# Patient Record
Sex: FEMALE | Race: WHITE | NOT HISPANIC OR LATINO | Employment: FULL TIME | URBAN - NONMETROPOLITAN AREA
[De-identification: names, ages, dates, MRNs, and addresses within clinical notes are randomized per-mention and may not be internally consistent; named-entity substitution may affect disease eponyms.]

---

## 2019-10-03 ENCOUNTER — OFFICE VISIT (OUTPATIENT)
Dept: OBSTETRICS AND GYNECOLOGY | Facility: CLINIC | Age: 24
End: 2019-10-03

## 2019-10-03 VITALS
BODY MASS INDEX: 21.05 KG/M2 | SYSTOLIC BLOOD PRESSURE: 108 MMHG | DIASTOLIC BLOOD PRESSURE: 74 MMHG | WEIGHT: 147 LBS | HEIGHT: 70 IN

## 2019-10-03 DIAGNOSIS — N94.6 DYSMENORRHEA: Primary | ICD-10-CM

## 2019-10-03 PROCEDURE — 99202 OFFICE O/P NEW SF 15 MIN: CPT | Performed by: OBSTETRICS & GYNECOLOGY

## 2019-10-03 RX ORDER — LEVONORGESTREL AND ETHINYL ESTRADIOL 0.15-0.03
KIT ORAL
COMMUNITY
Start: 2019-06-12 | End: 2019-10-03

## 2019-10-03 RX ORDER — NORETHINDRONE ACETATE AND ETHINYL ESTRADIOL 1; .02 MG/1; MG/1
1 TABLET ORAL DAILY
Qty: 42 TABLET | Refills: 12 | Status: SHIPPED | OUTPATIENT
Start: 2019-10-03 | End: 2020-10-02

## 2019-10-03 NOTE — PROGRESS NOTES
"Yumi Jerry is a 24 y.o. female here today to discuss treatment of dysmenorrhea.  She describes severe pelvic cramping around her menstrual period.  She has been on several different birth control pills, both cyclic and continuous, for treatment.  She is currently on Seasonale.  she reports that her symptoms are initially relieved, but with time her symptoms return.  She denies abdominal pain otherwise.  She denies vaginal discharge, or association of pain with bowel or bladder function.    Social History     Tobacco Use   • Smoking status: Never Smoker   • Smokeless tobacco: Never Used   Substance Use Topics   • Alcohol use: No     Frequency: Never   • Drug use: No      Visit Vitals  /74 (BP Location: Left arm, Patient Position: Sitting)   Ht 177.8 cm (70\")   Wt 66.7 kg (147 lb)   LMP 09/03/2019 (Approximate)   BMI 21.09 kg/m²     Pleasant female no acute distress  Mood and affect normal  Breathing unlabored  Abdomen nontender    Assessment: Primary dysmenorrhea    We discussed therapeutic options including cyclic birth control pills, continuous birth control pills, Depo-Provera, and a Mirena IUD.  She has chosen a trial of a different continuous birth control pill.  I have given her prescription and we discussed discarding placebo pills for up to 1 year.  We discussed a side effect of unscheduled bleeding.  She will follow-up in 2 to 3 months to recheck her symptoms, and for a pelvic ultrasound.    "

## 2020-10-12 RX ORDER — NORETHINDRONE ACETATE/ETHINYL ESTRADIOL 1MG-20MCG
KIT ORAL
Qty: 42 TABLET | Refills: 0 | Status: SHIPPED | OUTPATIENT
Start: 2020-10-12 | End: 2020-10-20

## 2020-10-14 ENCOUNTER — TELEPHONE (OUTPATIENT)
Dept: OBSTETRICS AND GYNECOLOGY | Facility: CLINIC | Age: 25
End: 2020-10-14

## 2020-10-14 NOTE — TELEPHONE ENCOUNTER
Spoke with patients mother 10/13 regarding her yearly appointment. Dr Padilla only allows a certain amount of yearlys on his schedule for a day and the patients mother was requesting specifically 10/20. The appointment was originally for an OV but is wanting it switched to a yearly.

## 2020-10-20 ENCOUNTER — OFFICE VISIT (OUTPATIENT)
Dept: OBSTETRICS AND GYNECOLOGY | Facility: CLINIC | Age: 25
End: 2020-10-20

## 2020-10-20 VITALS
BODY MASS INDEX: 21.05 KG/M2 | WEIGHT: 147 LBS | HEIGHT: 70 IN | DIASTOLIC BLOOD PRESSURE: 74 MMHG | SYSTOLIC BLOOD PRESSURE: 112 MMHG

## 2020-10-20 DIAGNOSIS — N94.6 DYSMENORRHEA: ICD-10-CM

## 2020-10-20 DIAGNOSIS — Z01.419 ENCOUNTER FOR GYNECOLOGICAL EXAMINATION WITHOUT ABNORMAL FINDING: Primary | ICD-10-CM

## 2020-10-20 DIAGNOSIS — Z12.4 SCREENING FOR CERVICAL CANCER: ICD-10-CM

## 2020-10-20 PROCEDURE — 99395 PREV VISIT EST AGE 18-39: CPT | Performed by: OBSTETRICS & GYNECOLOGY

## 2020-10-20 PROCEDURE — G0123 SCREEN CERV/VAG THIN LAYER: HCPCS | Performed by: OBSTETRICS & GYNECOLOGY

## 2020-10-20 RX ORDER — DESOGESTREL AND ETHINYL ESTRADIOL 0.15-0.03
1 KIT ORAL DAILY
Qty: 56 TABLET | Refills: 10 | Status: SHIPPED | OUTPATIENT
Start: 2020-10-20 | End: 2021-04-13

## 2020-10-20 NOTE — PROGRESS NOTES
"CC: annual exam    SUBJECTIVE: Yumi Jerry is a 25 y.o. female , para 0, who comes to the office today for annual GYN examination. Last menstrual period was 3 weeks ago and she has not had a Pap smear.  She is currently on OCPs for dysmenorrhea. Her medical history is reviewed.     HPI      Social History     Tobacco Use   • Smoking status: Never Smoker   • Smokeless tobacco: Never Used   Substance Use Topics   • Alcohol use: No     Frequency: Never   • Drug use: No        Review of Systems   Constitutional: Negative for fever.   Respiratory: Negative for cough.    Gastrointestinal: Negative for abdominal pain.   Genitourinary: Positive for menstrual problem.   Hematological: Does not bruise/bleed easily.       Visit Vitals  /74 (BP Location: Left arm, Patient Position: Sitting)   Ht 177.8 cm (70\")   Wt 66.7 kg (147 lb)   BMI 21.09 kg/m²      Objective   Physical Exam  Vitals signs and nursing note reviewed.   Constitutional:       General: She is not in acute distress.     Appearance: She is well-developed.   HENT:      Head: Normocephalic and atraumatic.   Neck:      Musculoskeletal: Normal range of motion and neck supple.      Thyroid: No thyromegaly.   Cardiovascular:      Rate and Rhythm: Normal rate and regular rhythm.      Heart sounds: No murmur.   Pulmonary:      Effort: Pulmonary effort is normal.      Breath sounds: Normal breath sounds.   Abdominal:      General: There is no distension.      Palpations: Abdomen is soft.      Tenderness: There is no abdominal tenderness.   Genitourinary:     General: Normal vulva.      Exam position: Lithotomy position.      Labia:         Right: No tenderness or lesion.         Left: No tenderness or lesion.       Urethra: No prolapse.      Vagina: Normal. No vaginal discharge or bleeding.      Cervix: Normal.      Uterus: Normal.       Adnexa:         Right: No tenderness or fullness.          Left: No tenderness or fullness.     Musculoskeletal: Normal range " of motion.   Skin:     General: Skin is warm and dry.   Neurological:      Mental Status: She is alert and oriented to person, place, and time.   Psychiatric:         Behavior: Behavior normal.         Judgment: Judgment normal.       Assessment/Plan   Diagnoses and all orders for this visit:    1. Encounter for gynecological examination without abnormal finding (Primary)    2. Dysmenorrhea  -     desogestrel-ethinyl estradiol (APRI) 0.15-30 MG-MCG per tablet; Take 1 tablet by mouth Daily. Take in continuous fashion as directed for painful periods  Dispense: 56 tablet; Refill: 10    3. Screening for cervical cancer  -     Liquid-based Pap Smear, Screening        I have refilled her birth control for a year.  We will notify her when the Pap smear results are available. We have discussed current Pap smear screening guidelines.  She will return in one year. In the meantime if she develops questions or problems, she will notify the office.

## 2020-10-22 LAB
GEN CATEG CVX/VAG CYTO-IMP: NORMAL
LAB AP CASE REPORT: NORMAL
LAB AP GYN ADDITIONAL INFORMATION: NORMAL
PATH INTERP SPEC-IMP: NORMAL
STAT OF ADQ CVX/VAG CYTO-IMP: NORMAL

## 2021-01-15 ENCOUNTER — TELEPHONE (OUTPATIENT)
Dept: OBSTETRICS AND GYNECOLOGY | Facility: CLINIC | Age: 26
End: 2021-01-15

## 2021-01-15 NOTE — TELEPHONE ENCOUNTER
Pt called stating her ocp was not working and she was experiencing cramping again. Called to inform pt to come in for f/u appt and discuss with Dr. Padilla, no answer, left voicemail to return my call at her earliest convenience.

## 2021-01-15 NOTE — TELEPHONE ENCOUNTER
Pt called back and wanted to know if he was just going to switch her birth controls because she is not wanting to change as she is getting  in September and is wanting to conceive pretty quick. I informed her I would speak with Dr. Padilla on Monday and see what he wanted to do and let her know on Monday, pt voiced understanding.

## 2021-01-20 NOTE — TELEPHONE ENCOUNTER
Called pt and informed her that I forgot to speak with Dr. Padilla before he left but I would speak with him tomorrow and give her a call, pt voiced understanding.

## 2021-01-21 NOTE — TELEPHONE ENCOUNTER
Patient called today and discussed her issues with her birth control, painful periods and issues with having a period even while on continuous birth control. Patient is ultimately wanting to come off her birth control soon due to wanting to conceive. Patient is very concerned with the amount of cramping and pain she is having with her periods and if it is related to endometriosis. Encouraged patient that an appointment with Dr Padilla would probably be most beneficial at this time that way they could sit down and discuss all concerns face to face with a possible US. Patient agrees to this and is sent to scheduling for an appointment.

## 2021-01-22 ENCOUNTER — OFFICE VISIT (OUTPATIENT)
Dept: OBSTETRICS AND GYNECOLOGY | Facility: CLINIC | Age: 26
End: 2021-01-22

## 2021-01-22 VITALS
SYSTOLIC BLOOD PRESSURE: 114 MMHG | BODY MASS INDEX: 21.33 KG/M2 | WEIGHT: 149 LBS | DIASTOLIC BLOOD PRESSURE: 82 MMHG | HEIGHT: 70 IN

## 2021-01-22 DIAGNOSIS — N94.6 DYSMENORRHEA: Primary | ICD-10-CM

## 2021-01-22 PROCEDURE — 99213 OFFICE O/P EST LOW 20 MIN: CPT | Performed by: OBSTETRICS & GYNECOLOGY

## 2021-01-22 RX ORDER — IBUPROFEN 800 MG/1
TABLET ORAL
Qty: 15 TABLET | Refills: 9 | Status: SHIPPED | OUTPATIENT
Start: 2021-01-22 | End: 2021-04-13

## 2021-01-22 NOTE — PROGRESS NOTES
"Yumi Jerry is a 25 y.o. female here today for follow-up of dysmenorrhea.  She is currently on a continuous birth control pill and has been for several years.  Over the last month she has had some breakthrough bleeding as well as episodic pelvic pain.  She is getting  in August and plans to try to get pregnant shortly thereafter.  She would like to come off of her birth control pills in anticipation of her marriage and attempts at pregnancy, but is afraid of her menstrual symptoms returning.    Visit Vitals  /82 (BP Location: Left arm, Patient Position: Sitting, Cuff Size: Adult)   Ht 177.8 cm (70\")   Wt 67.6 kg (149 lb)   LMP 01/10/2021   Breastfeeding No   BMI 21.38 kg/m²     Pleasant female no acute distress  Mood and affect normal  Breathing unlabored    Assessment: Dysmenorrhea, breakthrough bleeding on birth control pills    She wishes to go ahead and stop her continuous birth control pill at this time.  We discussed that her cycles should return to regular, and I would expect some dysmenorrhea as well.  However it has been quite a while since she has had a natural cycle, and we discussed the possibility of resolution of her symptoms.  We discussed nonhormonal therapy for dysmenorrhea including scheduled NSAIDs, and I have given her a prescription for 800 mg ibuprofen 3 times a day for 5 days.  She is instructed to begin this as soon as she knows that her bleeding is imminent.  If she does not get decent relief from this therapy, she may consider diagnostic laparoscopy.  She will follow-up as needed, and contact the office if she has questions or concerns.        "

## 2021-03-30 ENCOUNTER — TELEPHONE (OUTPATIENT)
Dept: OBSTETRICS AND GYNECOLOGY | Facility: CLINIC | Age: 26
End: 2021-03-30

## 2021-03-30 NOTE — TELEPHONE ENCOUNTER
Please call pt and sched her a Consult visit with Dr Padilla. Pt already knows what appt is for. Thank you.

## 2021-03-30 NOTE — TELEPHONE ENCOUNTER
Pt called c/o heavy period, states 800mg Ibuprofen not working. Rates pain @ a 4 today. Yest went thru 8 super plus tampons ang rated pain @ 8-9. LMP 3/28, before that was 2/22 lasted 2 wks. Pt ready to proceed with [facility]. Do you want me to go ahead and sched her a consult? Please advise.

## 2021-04-13 ENCOUNTER — OFFICE VISIT (OUTPATIENT)
Dept: OBSTETRICS AND GYNECOLOGY | Facility: CLINIC | Age: 26
End: 2021-04-13

## 2021-04-13 VITALS
WEIGHT: 154 LBS | BODY MASS INDEX: 22.05 KG/M2 | HEIGHT: 70 IN | DIASTOLIC BLOOD PRESSURE: 74 MMHG | SYSTOLIC BLOOD PRESSURE: 112 MMHG

## 2021-04-13 DIAGNOSIS — N94.6 DYSMENORRHEA: Primary | ICD-10-CM

## 2021-04-13 PROCEDURE — 99214 OFFICE O/P EST MOD 30 MIN: CPT | Performed by: OBSTETRICS & GYNECOLOGY

## 2021-04-13 RX ORDER — SODIUM CHLORIDE 9 MG/ML
100 INJECTION, SOLUTION INTRAVENOUS CONTINUOUS
Status: CANCELLED | OUTPATIENT
Start: 2021-04-13

## 2021-04-13 NOTE — PROGRESS NOTES
"Yumi Jerry is a 25 y.o. female here today for follow-up of dysmenorrhea.  Her symptoms were fairly well controlled on continuous birth control pills, but she is getting  in September and wanted to go ahead and come off of the pills as she wants to get pregnant soon after her marriage.  At her last visit we discussed using scheduled NSAIDs to try to control her menstrual cramping.  She reports that she had 2 days of severe cramping despite taking scheduled NSAIDs.  The cramping persisted for a few more days to a lesser extent even though bleeding was not heavy.  She is concerned about the possibility of endometriosis.    Visit Vitals  /74 (BP Location: Left arm, Patient Position: Sitting)   Ht 177.8 cm (70\")   Wt 69.9 kg (154 lb)   LMP 03/29/2021 (Exact Date)   BMI 22.10 kg/m²     Pleasant female no acute distress  Mood and affect normal  Breathing unlabored    Pelvic ultrasound from January 2021 was normal  Pap smear October 2020 was normal    Assessment: Dysmenorrhea    She is concerned about endometriosis, therefore we discussed a diagnostic laparoscopy.  She is ready to proceed with further evaluation of her symptoms, especially since endometriosis may impact fertility.  Therefore we discussed a planned surgical procedure of diagnostic laparoscopy with possible biopsy or treatment of endometriosis.  All of her questions were answered, and preoperative orders, including labs, have been placed.  She is scheduled for surgery on April 21.      "

## 2021-04-14 NOTE — H&P (VIEW-ONLY)
" [place]  Yumi Jerry  : 1995  MRN: [id number]  CSN: [id number]    History and Physical    Subjective   Yumi Jerry is a 25 y.o. year old  who presents for surgery due to persistent dysmenorrhea.  She is concerned about endometriosis.  Her pelvic ultrasound is normal    History reviewed. No pertinent past medical history.  Past Surgical History:   Procedure Laterality Date   • HAND SURGERY      fracture   • KNEE ARTHROSCOPY      x2   • TONSILLECTOMY     • WRIST ARTHROSCOPY       Social History    Tobacco Use      Smoking status: Never Smoker      Smokeless tobacco: Never Used    No current outpatient medications on file.    Allergies   Allergen Reactions   • Cephalosporins Unknown (See Comments)      -      • Penicillins Unknown (See Comments)      -          History reviewed. No pertinent family history.  Review of Systems   Constitutional: Negative for unexpected weight change.   HENT: Negative for trouble swallowing.    Respiratory: Negative for shortness of breath.    Cardiovascular: Negative for chest pain.   Musculoskeletal: Negative for neck stiffness.   Neurological: Negative for seizures.   Hematological: Does not bruise/bleed easily.         Objective   /74 (BP Location: Left arm, Patient Position: Sitting)   Ht 177.8 cm (70\")   Wt 69.9 kg (154 lb)   LMP 2021 (Exact Date)   BMI 22.10 kg/m²     Physical Exam   Physical Exam  Vitals reviewed.   Constitutional:       Appearance: She is well-developed.   HENT:      Head: Normocephalic and atraumatic.   Eyes:      General: No scleral icterus.  Neck:      Trachea: No tracheal deviation.   Cardiovascular:      Rate and Rhythm: Normal rate and regular rhythm.   Pulmonary:      Effort: Pulmonary effort is normal.      Breath sounds: Normal breath sounds.   Abdominal:      General: Bowel sounds are normal. There is no distension.      Palpations: Abdomen is soft.      Tenderness: There is no abdominal tenderness. "   Genitourinary:     Exam position: Supine.      Labia:         Right: No lesion.         Left: No lesion.       Vagina: No vaginal discharge or bleeding.      Cervix: No cervical motion tenderness.      Uterus: Not enlarged, not fixed and not tender.       Adnexa:         Right: No mass.          Left: No mass.        Rectum: No external hemorrhoid.   Skin:     General: Skin is warm and dry.   Neurological:      Mental Status: She is alert and oriented to person, place, and time.         Labs  No results found for: HCG, PLT, HGB, HCT, WBC, NA, K, CL, CO2, BUN, CREATININE, GLUCOSE, ALBUMIN, CALCIUM, AST, ALT, BILITOT     Assessment & Plan    Diagnoses and all orders for this visit:    1. Dysmenorrhea (Primary)  -     Case Request  -     CBC (No Diff); Future  -     Basic Metabolic Panel; Future  -     sodium chloride 0.9 % infusion    Other orders  -     Follow Anesthesia Guidelines / Standing Orders; Future  -     Provide NPO Instructions to Patient; Future  -     Chlorhexidine Skin Prep; Future  -     Follow Anesthesia Guidelines / Standing Orders; Standing  -     Verify NPO Status; Standing  -     Obtain informed consent; Standing  -     Have patient void prior to transfer; Standing  -     Verify / Perform Chlorhexidine Skin Prep; Standing  -     Verify / Perform Chlorhexidine Skin Prep if Indicated (If Not Already Completed); Standing    Risks, benefits, and alternatives of a diagnostic laparoscopy were discussed with the patient in detail. Intraoperative risks of bleeding and damage to surrounding organs, including but not limited to intestine, bladder and ureter, were explained. Management of these were also explained. Postoperative complications such as infection, pneumonia, DVT, and bleeding were explained. The importance of compliance with postoperative restrictions was discussed.     All of the patient's questions were answered to her satisfaction. She was encouraged to return for an additional appointment  if she had further questions. She verbalized understanding of the above and wished to proceed with the outlined plan.    Joseph Padilla MD  4/14/2021  10:51 CDT

## 2021-04-14 NOTE — H&P
" [place]  Yumi Jerry  : 1995  MRN: [id number]  CSN: [id number]    History and Physical    Subjective   Yumi Jerry is a 25 y.o. year old  who presents for surgery due to persistent dysmenorrhea.  She is concerned about endometriosis.  Her pelvic ultrasound is normal    History reviewed. No pertinent past medical history.  Past Surgical History:   Procedure Laterality Date   • HAND SURGERY      fracture   • KNEE ARTHROSCOPY      x2   • TONSILLECTOMY     • WRIST ARTHROSCOPY       Social History    Tobacco Use      Smoking status: Never Smoker      Smokeless tobacco: Never Used    No current outpatient medications on file.    Allergies   Allergen Reactions   • Cephalosporins Unknown (See Comments)      -      • Penicillins Unknown (See Comments)      -          History reviewed. No pertinent family history.  Review of Systems   Constitutional: Negative for unexpected weight change.   HENT: Negative for trouble swallowing.    Respiratory: Negative for shortness of breath.    Cardiovascular: Negative for chest pain.   Musculoskeletal: Negative for neck stiffness.   Neurological: Negative for seizures.   Hematological: Does not bruise/bleed easily.         Objective   /74 (BP Location: Left arm, Patient Position: Sitting)   Ht 177.8 cm (70\")   Wt 69.9 kg (154 lb)   LMP 2021 (Exact Date)   BMI 22.10 kg/m²     Physical Exam   Physical Exam  Vitals reviewed.   Constitutional:       Appearance: She is well-developed.   HENT:      Head: Normocephalic and atraumatic.   Eyes:      General: No scleral icterus.  Neck:      Trachea: No tracheal deviation.   Cardiovascular:      Rate and Rhythm: Normal rate and regular rhythm.   Pulmonary:      Effort: Pulmonary effort is normal.      Breath sounds: Normal breath sounds.   Abdominal:      General: Bowel sounds are normal. There is no distension.      Palpations: Abdomen is soft.      Tenderness: There is no abdominal tenderness. "   Genitourinary:     Exam position: Supine.      Labia:         Right: No lesion.         Left: No lesion.       Vagina: No vaginal discharge or bleeding.      Cervix: No cervical motion tenderness.      Uterus: Not enlarged, not fixed and not tender.       Adnexa:         Right: No mass.          Left: No mass.        Rectum: No external hemorrhoid.   Skin:     General: Skin is warm and dry.   Neurological:      Mental Status: She is alert and oriented to person, place, and time.         Labs  No results found for: HCG, PLT, HGB, HCT, WBC, NA, K, CL, CO2, BUN, CREATININE, GLUCOSE, ALBUMIN, CALCIUM, AST, ALT, BILITOT     Assessment & Plan    Diagnoses and all orders for this visit:    1. Dysmenorrhea (Primary)  -     Case Request  -     CBC (No Diff); Future  -     Basic Metabolic Panel; Future  -     sodium chloride 0.9 % infusion    Other orders  -     Follow Anesthesia Guidelines / Standing Orders; Future  -     Provide NPO Instructions to Patient; Future  -     Chlorhexidine Skin Prep; Future  -     Follow Anesthesia Guidelines / Standing Orders; Standing  -     Verify NPO Status; Standing  -     Obtain informed consent; Standing  -     Have patient void prior to transfer; Standing  -     Verify / Perform Chlorhexidine Skin Prep; Standing  -     Verify / Perform Chlorhexidine Skin Prep if Indicated (If Not Already Completed); Standing    Risks, benefits, and alternatives of a diagnostic laparoscopy were discussed with the patient in detail. Intraoperative risks of bleeding and damage to surrounding organs, including but not limited to intestine, bladder and ureter, were explained. Management of these were also explained. Postoperative complications such as infection, pneumonia, DVT, and bleeding were explained. The importance of compliance with postoperative restrictions was discussed.     All of the patient's questions were answered to her satisfaction. She was encouraged to return for an additional appointment  if she had further questions. She verbalized understanding of the above and wished to proceed with the outlined plan.    Joseph Padilla MD  4/14/2021  10:51 CDT

## 2021-04-15 ENCOUNTER — PRE-ADMISSION TESTING (OUTPATIENT)
Dept: PREADMISSION TESTING | Facility: HOSPITAL | Age: 26
End: 2021-04-15

## 2021-04-15 VITALS
BODY MASS INDEX: 23.35 KG/M2 | DIASTOLIC BLOOD PRESSURE: 80 MMHG | WEIGHT: 157.63 LBS | HEIGHT: 69 IN | SYSTOLIC BLOOD PRESSURE: 125 MMHG | RESPIRATION RATE: 18 BRPM | OXYGEN SATURATION: 100 % | HEART RATE: 78 BPM

## 2021-04-15 PROCEDURE — 85027 COMPLETE CBC AUTOMATED: CPT | Performed by: OBSTETRICS & GYNECOLOGY

## 2021-04-15 PROCEDURE — 80048 BASIC METABOLIC PNL TOTAL CA: CPT | Performed by: OBSTETRICS & GYNECOLOGY

## 2021-04-15 NOTE — DISCHARGE INSTRUCTIONS
DAY OF SURGERY INSTRUCTIONS        YOUR SURGEON: ***MAR PARKER    PROCEDURE: ***DIAGNOSTIC LAP    DATE OF SURGERY: ***April 21,2021    ARRIVAL TIME: AS DIRECTED BY OFFICE    YOU MAY TAKE THE FOLLOWING MEDICATION(S) THE MORNING OF SURGERY WITH A SIP OF WATER: ***NONE      ALL OTHER HOME MEDICATION CHECK WITH YOUR PHYSICIAN      DO NOT TAKE ANY ERECTILE DYSFUNCTION MEDICATIONS (EX: CIALIS, VIAGRA) 24 HOURS PRIOR TO SURGERY                      MANAGING PAIN AFTER SURGERY    We know you are probably wondering what your pain will be like after surgery.  Following surgery it is unrealistic to expect you will not have pain.   Pain is how our bodies let us know that something is wrong or cautions us to be careful.  That said, our goal is to make your pain tolerable.    Methods we may use to treat your pain include (oral or IV medications, PCAs, epidurals, nerve blocks, etc.)   While some procedures require IV pain medications for a short time after surgery, transitioning to pain medications by mouth allows for better management of pain.   Your nurse will encourage you to take oral pain medications whenever possible.  IV medications work almost immediately, but only last a short while.  Taking medications by mouth allows for a more constant level of medication in your blood stream for a longer period of time.      Once your pain is out of control it is harder to get back under control.  It is important you are aware when your next dose of pain medication is due.  If you are admitted, your nurse may write the time of your next dose on the white board in your room to help you remember.      We are interested in your pain and encourage you to inform us about aggravating factors during your visit.   Many times a simple repositioning every few hours can make a big difference.    If your physician says it is okay, do not let your pain prevent you from getting out of bed. Be sure to call your nurse for assistance prior to  getting up so you do not fall.      Before surgery, please decide your tolerable pain goal.  These faces help describe the pain ratings we use on a 0-10 scale.   Be prepared to tell us your goal and whether or not you take pain or anxiety medications at home.          BEFORE YOU COME TO THE HOSPITAL  (Pre-op instructions)  • Do not eat, drink, smoke or chew gum after midnight the night before surgery.  This also includes no mints.  • Morning of surgery take only the medicines you have been instructed with a sip of water unless otherwise instructed  by your physician.  • Do not shave, wear makeup or dark nail polish.  • Remove all jewelry including rings.  • Leave anything you consider valuable at home.  • Leave your suitcase in the car until after your surgery.  • Bring the following with you if applicable:  o Picture ID and insurance, Medicare or Medicaid cards  o Co-pay/deductible required by insurance (cash, check, credit card)  o Copy of advance directive, living will or power-of- documents if not brought to PAT  o CPAP or BIPAP mask and tubing  o Relaxation aids ( book, magazine), etc.  o Hearing aids                        ON THE DAY OF SURGERY  · On the day of surgery check in at registration located at the main entrance of the hospital.   ? You will be registered and given a beeper with instructions where to wait in the main lobby.  ? When your beeper lights up and vibrates a member of the Outpatient Surgery staff will meet you at the double doors under the stair steps and escort you to your preoperative room.   · You may have cloth compression devices placed on your legs. These help to prevent blood clots and reduce swelling in your legs.  · An IV may be inserted into one of your veins.  · In the operating room, you may be given one or more of the following:  ? A medicine to help you relax (sedative).  ? A medicine to numb the area (local anesthetic).  ? A medicine to make you fall asleep (general  "anesthetic).  ? A medicine that is injected into an area of your body to numb everything below the injection site (regional anesthetic).  · Your surgical site will be marked or identified.  · You may be given an antibiotic through your IV to help prevent infection.  Contact a health care provider if you:  · Develop a fever of more than 100.4°F (38°C) or other feelings of illness during the 48 hours before your surgery.  · Have symptoms that get worse.  Have questions or concerns about your surgery    General Anesthesia/Surgery, Adult  General anesthesia is the use of medicines to make a person \"go to sleep\" (unconscious) for a medical procedure. General anesthesia must be used for certain procedures, and is often recommended for procedures that:  · Last a long time.  · Require you to be still or in an unusual position.  · Are major and can cause blood loss.  The medicines used for general anesthesia are called general anesthetics. As well as making you unconscious for a certain amount of time, these medicines:  · Prevent pain.  · Control your blood pressure.  · Relax your muscles.  Tell a health care provider about:  · Any allergies you have.  · All medicines you are taking, including vitamins, herbs, eye drops, creams, and over-the-counter medicines.  · Any problems you or family members have had with anesthetic medicines.  · Types of anesthetics you have had in the past.  · Any blood disorders you have.  · Any surgeries you have had.  · Any medical conditions you have.  · Any recent upper respiratory, chest, or ear infections.  · Any history of:  ? Heart or lung conditions, such as heart failure, sleep apnea, asthma, or chronic obstructive pulmonary disease (COPD).  ?  service.  ? Depression or anxiety.  · Any tobacco or drug use, including marijuana or alcohol use.  · Whether you are pregnant or may be pregnant.  What are the risks?  Generally, this is a safe procedure. However, problems may occur, " including:  · Allergic reaction.  · Lung and heart problems.  · Inhaling food or liquid from the stomach into the lungs (aspiration).  · Nerve injury.  · Air in the bloodstream, which can lead to stroke.  · Extreme agitation or confusion (delirium) when you wake up from the anesthetic.  · Waking up during your procedure and being unable to move. This is rare.  These problems are more likely to develop if you are having a major surgery or if you have an advanced or serious medical condition. You can prevent some of these complications by answering all of your health care provider's questions thoroughly and by following all instructions before your procedure.  General anesthesia can cause side effects, including:  · Nausea or vomiting.  · A sore throat from the breathing tube.  · Hoarseness.  · Wheezing or coughing.  · Shaking chills.  · Tiredness.  · Body aches.  · Anxiety.  · Sleepiness or drowsiness.  · Confusion or agitation.  RISKS AND COMPLICATIONS OF SURGERY  Your health care provider will discuss possible risks and complications with you before surgery. Common risks and complications include:    · Problems due to the use of anesthetics.  · Blood loss and replacement (does not apply to minor surgical procedures).  · Temporary increase in pain due to surgery.  · Uncorrected pain or problems that the surgery was meant to correct.  · Infection.  · New damage.    What happens before the procedure?    Medicines  Ask your health care provider about:  · Changing or stopping your regular medicines. This is especially important if you are taking diabetes medicines or blood thinners.  · Taking medicines such as aspirin and ibuprofen. These medicines can thin your blood. Do not take these medicines unless your health care provider tells you to take them.  · Taking over-the-counter medicines, vitamins, herbs, and supplements. Do not take these during the week before your procedure unless your health care provider approves  them.  General instructions  · Starting 3-6 weeks before the procedure, do not use any products that contain nicotine or tobacco, such as cigarettes and e-cigarettes. If you need help quitting, ask your health care provider.  · If you brush your teeth on the morning of the procedure, make sure to spit out all of the toothpaste.  · Tell your health care provider if you become ill or develop a cold, cough, or fever.  · If instructed by your health care provider, bring your sleep apnea device with you on the day of your surgery (if applicable).  · Ask your health care provider if you will be going home the same day, the following day, or after a longer hospital stay.  ? Plan to have someone take you home from the hospital or clinic.  ? Plan to have a responsible adult care for you for at least 24 hours after you leave the hospital or clinic. This is important.  What happens during the procedure?  · You will be given anesthetics through both of the following:  ? A mask placed over your nose and mouth.  ? An IV in one of your veins.  · You may receive a medicine to help you relax (sedative).  · After you are unconscious, a breathing tube may be inserted down your throat to help you breathe. This will be removed before you wake up.  · An anesthesia specialist will stay with you throughout your procedure. He or she will:  ? Keep you comfortable and safe by continuing to give you medicines and adjusting the amount of medicine that you get.  ? Monitor your blood pressure, pulse, and oxygen levels to make sure that the anesthetics do not cause any problems.  The procedure may vary among health care providers and hospitals.  What happens after the procedure?  · Your blood pressure, temperature, heart rate, breathing rate, and blood oxygen level will be monitored until the medicines you were given have worn off.  · You will wake up in a recovery area. You may wake up slowly.  · If you feel anxious or agitated, you may be given  medicine to help you calm down.  · If you will be going home the same day, your health care provider may check to make sure you can walk, drink, and urinate.  · Your health care provider will treat any pain or side effects you have before you go home.  · Do not drive for 24 hours if you were given a sedative.  Summary  · General anesthesia is used to keep you still and prevent pain during a procedure.  · It is important to tell your healthcare provider about your medical history and any surgeries you have had, and previous experience with anesthesia.  · Follow your healthcare provider’s instructions about when to stop eating, drinking, or taking certain medicines before your procedure.  · Plan to have someone take you home from the hospital or clinic.  This information is not intended to replace advice given to you by your health care provider. Make sure you discuss any questions you have with your health care provider.  Document Released: 03/26/2009 Document Revised: 08/03/2018 Document Reviewed: 08/03/2018  [employer] Interactive Patient Education © 2019 [employer]       Fall Prevention in Hospitals, Adult  As a hospital patient, your condition and the treatments you receive can increase your risk for falls. Some additional risk factors for falls in a hospital include:  · Being in an unfamiliar environment.  · Being on bed rest.  · Your surgery.  · Taking certain medicines.  · Your tubing requirements, such as intravenous (IV) therapy or catheters.  It is important that you learn how to decrease fall risks while at the hospital. Below are important tips that can help prevent falls.  SAFETY TIPS FOR PREVENTING FALLS  Talk about your risk of falling.  · Ask your health care provider why you are at risk for falling. Is it your medicine, illness, tubing placement, or something else?  · Make a plan with your health care provider to keep you safe from falls.  · Ask your health care provider or pharmacist about side  effects of your medicines. Some medicines can make you dizzy or affect your coordination.  Ask for help.  · Ask for help before getting out of bed. You may need to press your call button.  · Ask for assistance in getting safely to the toilet.  · Ask for a walker or cane to be put at your bedside. Ask that most of the side rails on your bed be placed up before your health care provider leaves the room.  · Ask family or friends to sit with you.  · Ask for things that are out of your reach, such as your glasses, hearing aids, telephone, bedside table, or call button.  Follow these tips to avoid falling:  · Stay lying or seated, rather than standing, while waiting for help.  · Wear rubber-soled slippers or shoes whenever you walk in the hospital.  · Avoid quick, sudden movements.  ¨ Change positions slowly.  ¨ Sit on the side of your bed before standing.  ¨ Stand up slowly and wait before you start to walk.  · Let your health care provider know if there is a spill on the floor.  · Pay careful attention to the medical equipment, electrical cords, and tubes around you.  · When you need help, use your call button by your bed or in the bathroom. Wait for one of your health care providers to help you.  · If you feel dizzy or unsure of your footing, return to bed and wait for assistance.  · Avoid being distracted by the TV, telephone, or another person in your room.  · Do not lean or support yourself on rolling objects, such as IV poles or bedside tables.     This information is not intended to replace advice given to you by your health care provider. Make sure you discuss any questions you have with your health care provider.     Document Released: 12/15/2001 Document Revised: 01/08/2016 Document Reviewed: 08/25/2013  [employer] Interactive Patient Education ©2016 [employer]       [facility]  CHG 4% Patient Instruction Sheet    Chlorhexidine Before Surgery  Chlorhexidine gluconate (CHG) is a germ-killing  (antiseptic) solution that is used to clean the skin. It gets rid of the bacteria that normally live on the skin. Cleaning your skin with CHG before surgery helps lower the risk for infection after surgery.    How to use CHG solution  · You will take 2 showers, one shower the night before surgery, the second shower the morning of surgery before coming to the hospital.  · Use CHG only as told by your health care provider, and follow the instructions on the label.  · Use CHG solution while taking a shower. Follow these steps when using CHG solution (unless your health care provider gives you different instructions):  1. Start the shower.  2. Use your normal soap and shampoo to wash your face and hair.  3. Turn off the shower or move out of the shower stream.  4. Pour the CHG onto a clean washcloth. Do not use any type of brush or rough-edged sponge.  5. Starting at your neck, lather your body down to your toes. Make sure you:  6. Pay special attention to the part of your body where you will be having surgery. Scrub this area for at least 1 minute.  7. Use the full amount of CHG as directed. Usually, this is one half bottle for each shower.  8. Do not use CHG on your head or face. If the solution gets into your ears or eyes, rinse them well with water.  9. Avoid your genital area.  10. Avoid any areas of skin that have broken skin, cuts, or scrapes.  11. Scrub your back and under your arms. Make sure to wash skin folds.  12. Let the lather sit on your skin for 1-2 minutes or as long as told by your health care  provider.  13. Thoroughly rinse your entire body in the shower. Make sure that all body creases and crevices are rinsed well.  14. Dry off with a clean towel. Do not put any substances on your body afterward, such as powder, lotion, or perfume.  15. Put on clean clothes or pajamas.  16. If it is the night before your surgery, sleep in clean sheets.    What are the risks?  Risks of using CHG include:  · A skin  reaction.  · Hearing loss, if CHG gets in your ears.  · Eye injury, if CHG gets in your eyes and is not rinsed out.  · The CHG product catching fire.  Make sure that you avoid smoking and flames after applying CHG to your skin.  Do not use CHG:  · If you have a chlorhexidine allergy or have previously reacted to chlorhexidine.  · On babies younger than 2 months of age.      On the day of surgery, when you are taken to your room in Outpatient Surgery you will be given a CHG prepackaged cloth to wipe the site for your surgery.  How to use CHG prepackaged cloths  · Follow the instructions on the label.  · Use the CHG cloth on clean, dry skin. Follow these steps when using a CHG cloth (unless your health care provider gives you different instructions):  1. Using the CHG cloth, vigorously scrub the part of your body where you will be having surgery. Scrub using a back-and-forth motion for 3 minutes. The area on your body should be completely wet with CHG when you are finished scrubbing.  2. Do not rinse. Discard the cloth and let the area air-dry for 1 minute. Do not put any substances on your body afterward, such as powder, lotion, or perfume.  Contact a health care provider if:  · Your skin gets irritated after scrubbing.  · You have questions about using your solution or cloth.  Get help right away if:  · Your eyes become very red or swollen.  · Your eyes itch badly.  · Your skin itches badly and is red or swollen.  · Your hearing changes.  · You have trouble seeing.  · You have swelling or tingling in your mouth or throat.  · You have trouble breathing.  · You swallow any chlorhexidine.  Summary  · Chlorhexidine gluconate (CHG) is a germ-killing (antiseptic) solution that is used to clean the skin. Cleaning your skin with CHG before surgery helps lower the risk for infection after surgery.  · You may be given CHG to use at home. It may be in a bottle or in a prepackaged cloth to use on your skin. Carefully follow  your health care provider's instructions and the instructions on the product label.  · Do not use CHG if you have a chlorhexidine allergy.  · Contact your health care provider if your skin gets irritated after scrubbing.  This information is not intended to replace advice given to you by your health care provider. Make sure you discuss any questions you have with your health care provider.  Document Released: 09/11/2013 Document Revised: 11/15/2018 Document Reviewed: 11/15/2018  [employer] Interactive Patient Education © 2019 [employer]          PATIENT/FAMILY/RESPONSIBLE PARTY VERBALIZES UNDERSTANDING OF ABOVE EDUCATION.  COPY OF PAIN SCALE GIVEN AND REVIEWED WITH VERBALIZED UNDERSTANDING.

## 2021-04-16 ENCOUNTER — TRANSCRIBE ORDERS (OUTPATIENT)
Dept: ADMINISTRATIVE | Facility: HOSPITAL | Age: 26
End: 2021-04-16

## 2021-04-16 DIAGNOSIS — Z11.59 SCREENING FOR VIRAL DISEASE: Primary | ICD-10-CM

## 2021-04-19 ENCOUNTER — LAB (OUTPATIENT)
Dept: LAB | Facility: HOSPITAL | Age: 26
End: 2021-04-19

## 2021-04-19 LAB — SARS-COV-2 ORF1AB RESP QL NAA+PROBE: NOT DETECTED

## 2021-04-19 PROCEDURE — C9803 HOPD COVID-19 SPEC COLLECT: HCPCS | Performed by: OBSTETRICS & GYNECOLOGY

## 2021-04-19 PROCEDURE — U0004 COV-19 TEST NON-CDC HGH THRU: HCPCS | Performed by: OBSTETRICS & GYNECOLOGY

## 2021-04-21 ENCOUNTER — ANESTHESIA EVENT (OUTPATIENT)
Dept: PERIOP | Facility: HOSPITAL | Age: 26
End: 2021-04-21

## 2021-04-21 ENCOUNTER — ANESTHESIA (OUTPATIENT)
Dept: PERIOP | Facility: HOSPITAL | Age: 26
End: 2021-04-21

## 2021-04-21 ENCOUNTER — HOSPITAL ENCOUNTER (OUTPATIENT)
Facility: HOSPITAL | Age: 26
Setting detail: HOSPITAL OUTPATIENT SURGERY
Discharge: HOME | End: 2021-04-21
Attending: OBSTETRICS & GYNECOLOGY | Admitting: OBSTETRICS & GYNECOLOGY

## 2021-04-21 VITALS
TEMPERATURE: 98.8 F | SYSTOLIC BLOOD PRESSURE: 112 MMHG | HEART RATE: 76 BPM | RESPIRATION RATE: 14 BRPM | DIASTOLIC BLOOD PRESSURE: 72 MMHG | OXYGEN SATURATION: 99 %

## 2021-04-21 DIAGNOSIS — N94.6 DYSMENORRHEA: ICD-10-CM

## 2021-04-21 LAB — B-HCG UR QL: NEGATIVE

## 2021-04-21 PROCEDURE — 25010000002 KETOROLAC TROMETHAMINE PER 15 MG: Performed by: NURSE ANESTHETIST, CERTIFIED REGISTERED

## 2021-04-21 PROCEDURE — 81025 URINE PREGNANCY TEST: CPT | Performed by: OBSTETRICS & GYNECOLOGY

## 2021-04-21 PROCEDURE — 25010000002 ONDANSETRON PER 1 MG: Performed by: NURSE ANESTHETIST, CERTIFIED REGISTERED

## 2021-04-21 PROCEDURE — 25010000002 DEXAMETHASONE PER 1 MG: Performed by: ANESTHESIOLOGY

## 2021-04-21 PROCEDURE — 25010000002 HYDROMORPHONE PER 4 MG: Performed by: ANESTHESIOLOGY

## 2021-04-21 PROCEDURE — 25010000002 FENTANYL CITRATE (PF) 100 MCG/2ML SOLUTION: Performed by: NURSE ANESTHETIST, CERTIFIED REGISTERED

## 2021-04-21 PROCEDURE — 25010000002 MIDAZOLAM PER 1 MG: Performed by: ANESTHESIOLOGY

## 2021-04-21 PROCEDURE — 25010000002 ONDANSETRON PER 1 MG: Performed by: ANESTHESIOLOGY

## 2021-04-21 PROCEDURE — 49320 DIAG LAPARO SEPARATE PROC: CPT | Performed by: OBSTETRICS & GYNECOLOGY

## 2021-04-21 PROCEDURE — 25010000002 PROPOFOL 10 MG/ML EMULSION: Performed by: NURSE ANESTHETIST, CERTIFIED REGISTERED

## 2021-04-21 RX ORDER — IBUPROFEN 600 MG/1
600 TABLET ORAL ONCE AS NEEDED
Status: DISCONTINUED | OUTPATIENT
Start: 2021-04-21 | End: 2021-04-21 | Stop reason: HOSPADM

## 2021-04-21 RX ORDER — FENTANYL CITRATE 50 UG/ML
25 INJECTION, SOLUTION INTRAMUSCULAR; INTRAVENOUS
Status: DISCONTINUED | OUTPATIENT
Start: 2021-04-21 | End: 2021-04-21 | Stop reason: HOSPADM

## 2021-04-21 RX ORDER — OXYCODONE AND ACETAMINOPHEN 10; 325 MG/1; MG/1
1 TABLET ORAL ONCE AS NEEDED
Status: DISCONTINUED | OUTPATIENT
Start: 2021-04-21 | End: 2021-04-21 | Stop reason: HOSPADM

## 2021-04-21 RX ORDER — LIDOCAINE HYDROCHLORIDE 10 MG/ML
0.5 INJECTION, SOLUTION EPIDURAL; INFILTRATION; INTRACAUDAL; PERINEURAL ONCE AS NEEDED
Status: DISCONTINUED | OUTPATIENT
Start: 2021-04-21 | End: 2021-04-21 | Stop reason: HOSPADM

## 2021-04-21 RX ORDER — DEXAMETHASONE SODIUM PHOSPHATE 4 MG/ML
4 INJECTION, SOLUTION INTRA-ARTICULAR; INTRALESIONAL; INTRAMUSCULAR; INTRAVENOUS; SOFT TISSUE ONCE AS NEEDED
Status: COMPLETED | OUTPATIENT
Start: 2021-04-21 | End: 2021-04-21

## 2021-04-21 RX ORDER — FLUMAZENIL 0.1 MG/ML
0.2 INJECTION INTRAVENOUS AS NEEDED
Status: DISCONTINUED | OUTPATIENT
Start: 2021-04-21 | End: 2021-04-21 | Stop reason: HOSPADM

## 2021-04-21 RX ORDER — HYDROCODONE BITARTRATE AND ACETAMINOPHEN 5; 325 MG/1; MG/1
1 TABLET ORAL ONCE AS NEEDED
Status: DISCONTINUED | OUTPATIENT
Start: 2021-04-21 | End: 2021-04-21 | Stop reason: HOSPADM

## 2021-04-21 RX ORDER — SODIUM CHLORIDE, SODIUM LACTATE, POTASSIUM CHLORIDE, CALCIUM CHLORIDE 600; 310; 30; 20 MG/100ML; MG/100ML; MG/100ML; MG/100ML
1000 INJECTION, SOLUTION INTRAVENOUS CONTINUOUS
Status: DISCONTINUED | OUTPATIENT
Start: 2021-04-21 | End: 2021-04-21 | Stop reason: HOSPADM

## 2021-04-21 RX ORDER — SODIUM CHLORIDE 0.9 % (FLUSH) 0.9 %
10 SYRINGE (ML) INJECTION AS NEEDED
Status: DISCONTINUED | OUTPATIENT
Start: 2021-04-21 | End: 2021-04-21 | Stop reason: HOSPADM

## 2021-04-21 RX ORDER — SODIUM CHLORIDE 0.9 % (FLUSH) 0.9 %
10 SYRINGE (ML) INJECTION EVERY 12 HOURS SCHEDULED
Status: DISCONTINUED | OUTPATIENT
Start: 2021-04-21 | End: 2021-04-21 | Stop reason: HOSPADM

## 2021-04-21 RX ORDER — HYDROCODONE BITARTRATE AND ACETAMINOPHEN 5; 325 MG/1; MG/1
1 TABLET ORAL EVERY 6 HOURS PRN
Qty: 10 TABLET | Refills: 0 | Status: ON HOLD | OUTPATIENT
Start: 2021-04-21 | End: 2022-07-26

## 2021-04-21 RX ORDER — DEXTROSE MONOHYDRATE 25 G/50ML
12.5 INJECTION, SOLUTION INTRAVENOUS AS NEEDED
Status: DISCONTINUED | OUTPATIENT
Start: 2021-04-21 | End: 2021-04-21 | Stop reason: HOSPADM

## 2021-04-21 RX ORDER — LIDOCAINE HYDROCHLORIDE 40 MG/ML
SOLUTION TOPICAL AS NEEDED
Status: DISCONTINUED | OUTPATIENT
Start: 2021-04-21 | End: 2021-04-21 | Stop reason: SURG

## 2021-04-21 RX ORDER — HYDROMORPHONE HYDROCHLORIDE 1 MG/ML
0.5 INJECTION, SOLUTION INTRAMUSCULAR; INTRAVENOUS; SUBCUTANEOUS
Status: DISCONTINUED | OUTPATIENT
Start: 2021-04-21 | End: 2021-04-21 | Stop reason: HOSPADM

## 2021-04-21 RX ORDER — SODIUM CHLORIDE, SODIUM LACTATE, POTASSIUM CHLORIDE, CALCIUM CHLORIDE 600; 310; 30; 20 MG/100ML; MG/100ML; MG/100ML; MG/100ML
9 INJECTION, SOLUTION INTRAVENOUS CONTINUOUS
Status: DISCONTINUED | OUTPATIENT
Start: 2021-04-21 | End: 2021-04-21 | Stop reason: HOSPADM

## 2021-04-21 RX ORDER — ONDANSETRON 2 MG/ML
4 INJECTION INTRAMUSCULAR; INTRAVENOUS AS NEEDED
Status: DISCONTINUED | OUTPATIENT
Start: 2021-04-21 | End: 2021-04-21 | Stop reason: HOSPADM

## 2021-04-21 RX ORDER — LIDOCAINE HYDROCHLORIDE 20 MG/ML
INJECTION, SOLUTION EPIDURAL; INFILTRATION; INTRACAUDAL; PERINEURAL AS NEEDED
Status: DISCONTINUED | OUTPATIENT
Start: 2021-04-21 | End: 2021-04-21 | Stop reason: SURG

## 2021-04-21 RX ORDER — SODIUM CHLORIDE 9 MG/ML
100 INJECTION, SOLUTION INTRAVENOUS CONTINUOUS
Status: DISCONTINUED | OUTPATIENT
Start: 2021-04-21 | End: 2021-04-21 | Stop reason: HOSPADM

## 2021-04-21 RX ORDER — MIDAZOLAM HYDROCHLORIDE 1 MG/ML
1 INJECTION INTRAMUSCULAR; INTRAVENOUS
Status: DISCONTINUED | OUTPATIENT
Start: 2021-04-21 | End: 2021-04-21 | Stop reason: HOSPADM

## 2021-04-21 RX ORDER — MAGNESIUM HYDROXIDE 1200 MG/15ML
LIQUID ORAL AS NEEDED
Status: DISCONTINUED | OUTPATIENT
Start: 2021-04-21 | End: 2021-04-21 | Stop reason: HOSPADM

## 2021-04-21 RX ORDER — ONDANSETRON 2 MG/ML
INJECTION INTRAMUSCULAR; INTRAVENOUS AS NEEDED
Status: DISCONTINUED | OUTPATIENT
Start: 2021-04-21 | End: 2021-04-21 | Stop reason: SURG

## 2021-04-21 RX ORDER — ROCURONIUM BROMIDE 10 MG/ML
INJECTION, SOLUTION INTRAVENOUS AS NEEDED
Status: DISCONTINUED | OUTPATIENT
Start: 2021-04-21 | End: 2021-04-21 | Stop reason: SURG

## 2021-04-21 RX ORDER — SCOLOPAMINE TRANSDERMAL SYSTEM 1 MG/1
1 PATCH, EXTENDED RELEASE TRANSDERMAL CONTINUOUS
Status: DISCONTINUED | OUTPATIENT
Start: 2021-04-21 | End: 2021-04-21 | Stop reason: HOSPADM

## 2021-04-21 RX ORDER — MIDAZOLAM HYDROCHLORIDE 1 MG/ML
2 INJECTION INTRAMUSCULAR; INTRAVENOUS
Status: DISCONTINUED | OUTPATIENT
Start: 2021-04-21 | End: 2021-04-21 | Stop reason: HOSPADM

## 2021-04-21 RX ORDER — SODIUM CHLORIDE 0.9 % (FLUSH) 0.9 %
3 SYRINGE (ML) INJECTION AS NEEDED
Status: DISCONTINUED | OUTPATIENT
Start: 2021-04-21 | End: 2021-04-21 | Stop reason: HOSPADM

## 2021-04-21 RX ORDER — BUPIVACAINE HYDROCHLORIDE 2.5 MG/ML
INJECTION, SOLUTION INFILTRATION; PERINEURAL AS NEEDED
Status: DISCONTINUED | OUTPATIENT
Start: 2021-04-21 | End: 2021-04-21 | Stop reason: HOSPADM

## 2021-04-21 RX ORDER — ACETAMINOPHEN 500 MG
1000 TABLET ORAL EVERY 6 HOURS PRN
COMMUNITY
End: 2022-08-06 | Stop reason: HOSPADM

## 2021-04-21 RX ORDER — ONDANSETRON 2 MG/ML
4 INJECTION INTRAMUSCULAR; INTRAVENOUS ONCE AS NEEDED
Status: DISCONTINUED | OUTPATIENT
Start: 2021-04-21 | End: 2021-04-21 | Stop reason: HOSPADM

## 2021-04-21 RX ORDER — LABETALOL HYDROCHLORIDE 5 MG/ML
5 INJECTION, SOLUTION INTRAVENOUS
Status: DISCONTINUED | OUTPATIENT
Start: 2021-04-21 | End: 2021-04-21 | Stop reason: HOSPADM

## 2021-04-21 RX ORDER — SODIUM CHLORIDE 9 MG/ML
INJECTION, SOLUTION INTRAVENOUS AS NEEDED
Status: DISCONTINUED | OUTPATIENT
Start: 2021-04-21 | End: 2021-04-21 | Stop reason: HOSPADM

## 2021-04-21 RX ORDER — KETOROLAC TROMETHAMINE 30 MG/ML
INJECTION, SOLUTION INTRAMUSCULAR; INTRAVENOUS AS NEEDED
Status: DISCONTINUED | OUTPATIENT
Start: 2021-04-21 | End: 2021-04-21 | Stop reason: SURG

## 2021-04-21 RX ORDER — PROPOFOL 10 MG/ML
VIAL (ML) INTRAVENOUS AS NEEDED
Status: DISCONTINUED | OUTPATIENT
Start: 2021-04-21 | End: 2021-04-21 | Stop reason: SURG

## 2021-04-21 RX ORDER — FENTANYL CITRATE 50 UG/ML
INJECTION, SOLUTION INTRAMUSCULAR; INTRAVENOUS AS NEEDED
Status: DISCONTINUED | OUTPATIENT
Start: 2021-04-21 | End: 2021-04-21 | Stop reason: SURG

## 2021-04-21 RX ORDER — NALOXONE HCL 0.4 MG/ML
0.04 VIAL (ML) INJECTION AS NEEDED
Status: DISCONTINUED | OUTPATIENT
Start: 2021-04-21 | End: 2021-04-21 | Stop reason: HOSPADM

## 2021-04-21 RX ORDER — NEOSTIGMINE METHYLSULFATE 5 MG/5 ML
SYRINGE (ML) INTRAVENOUS AS NEEDED
Status: DISCONTINUED | OUTPATIENT
Start: 2021-04-21 | End: 2021-04-21 | Stop reason: SURG

## 2021-04-21 RX ADMIN — FENTANYL CITRATE 100 MCG: 50 INJECTION, SOLUTION INTRAMUSCULAR; INTRAVENOUS at 11:29

## 2021-04-21 RX ADMIN — SODIUM CHLORIDE, POTASSIUM CHLORIDE, SODIUM LACTATE AND CALCIUM CHLORIDE 1000 ML: 600; 310; 30; 20 INJECTION, SOLUTION INTRAVENOUS at 12:15

## 2021-04-21 RX ADMIN — LIDOCAINE HYDROCHLORIDE 100 MG: 20 INJECTION, SOLUTION EPIDURAL; INFILTRATION; INTRACAUDAL; PERINEURAL at 11:29

## 2021-04-21 RX ADMIN — DEXAMETHASONE SODIUM PHOSPHATE 4 MG: 4 INJECTION, SOLUTION INTRA-ARTICULAR; INTRALESIONAL; INTRAMUSCULAR; INTRAVENOUS; SOFT TISSUE at 10:49

## 2021-04-21 RX ADMIN — PROPOFOL 200 MG: 10 INJECTION, EMULSION INTRAVENOUS at 11:29

## 2021-04-21 RX ADMIN — SCOPALAMINE 1 PATCH: 1 PATCH, EXTENDED RELEASE TRANSDERMAL at 10:49

## 2021-04-21 RX ADMIN — KETOROLAC TROMETHAMINE 30 MG: 30 INJECTION, SOLUTION INTRAMUSCULAR at 11:53

## 2021-04-21 RX ADMIN — ONDANSETRON HYDROCHLORIDE 4 MG: 2 SOLUTION INTRAMUSCULAR; INTRAVENOUS at 12:27

## 2021-04-21 RX ADMIN — ONDANSETRON HYDROCHLORIDE 4 MG: 2 SOLUTION INTRAMUSCULAR; INTRAVENOUS at 11:53

## 2021-04-21 RX ADMIN — HYDROMORPHONE HYDROCHLORIDE 0.5 MG: 1 INJECTION, SOLUTION INTRAMUSCULAR; INTRAVENOUS; SUBCUTANEOUS at 12:34

## 2021-04-21 RX ADMIN — GLYCOPYRROLATE 0.4 MG: 0.2 INJECTION, SOLUTION INTRAMUSCULAR; INTRAVENOUS at 11:59

## 2021-04-21 RX ADMIN — LIDOCAINE HYDROCHLORIDE 1 EACH: 40 SOLUTION TOPICAL at 11:29

## 2021-04-21 RX ADMIN — MIDAZOLAM 2 MG: 1 INJECTION INTRAMUSCULAR; INTRAVENOUS at 10:49

## 2021-04-21 RX ADMIN — ROCURONIUM BROMIDE 30 MG: 10 INJECTION INTRAVENOUS at 11:29

## 2021-04-21 RX ADMIN — Medication 3 MG: at 11:59

## 2021-04-21 RX ADMIN — SODIUM CHLORIDE, POTASSIUM CHLORIDE, SODIUM LACTATE AND CALCIUM CHLORIDE 1000 ML: 600; 310; 30; 20 INJECTION, SOLUTION INTRAVENOUS at 09:30

## 2021-04-21 NOTE — ANESTHESIA PROCEDURE NOTES
Airway  Urgency: elective    Date/Time: 4/21/2021 11:31 AM  Airway not difficult    General Information and Staff    Patient location during procedure: OR  CRNA: Lisa Stoner CRNA    Indications and Patient Condition  Indications for airway management: airway protection    Preoxygenated: yes  Mask difficulty assessment: 1 - vent by mask    Final Airway Details  Final airway type: endotracheal airway      Successful airway: ETT  Cuffed: yes   Successful intubation technique: direct laryngoscopy  Facilitating devices/methods: intubating stylet  Endotracheal tube insertion site: oral  Blade: Mao  Blade size: 3.5.  ETT size (mm): 7.0  Cormack-Lehane Classification: grade I - full view of glottis  Placement verified by: chest auscultation and capnometry   Cuff volume (mL): 5  Measured from: lips  ETT/EBT  to lips (cm): 21  Number of attempts at approach: 1  Assessment: lips, teeth, and gum same as pre-op and atraumatic intubation

## 2021-04-21 NOTE — ANESTHESIA PREPROCEDURE EVALUATION
Anesthesia Evaluation     Patient summary reviewed   history of anesthetic complications: prolonged sedation  NPO Solid Status: > 8 hours             Airway   Mallampati: I  TM distance: >3 FB  Neck ROM: full  Dental      Pulmonary    (-) COPD, asthma, sleep apnea, not a smoker  Cardiovascular   Exercise tolerance: excellent (>7 METS)    (-) pacemaker, past MI, angina, cardiac stents      Neuro/Psych  (-) seizures, TIA, CVA  GI/Hepatic/Renal/Endo    (-) GERD, liver disease, no renal disease, diabetes    Musculoskeletal     Abdominal    Substance History      OB/GYN    (-)  Pregnant        Other                        Anesthesia Plan    ASA 1     general     intravenous induction     Anesthetic plan, all risks, benefits, and alternatives have been provided, discussed and informed consent has been obtained with: patient.

## 2021-04-21 NOTE — OP NOTE
[facility]  Yumi Jerry  : 1995  MRN: [id number]  [facility]: [id number]  Date: 2021    Operative Note    DIAGNOSTIC LAPAROSCOPY      Pre-op Diagnosis:  Dysmenorrhea [N94.6]   Post-op Diagnosis:  Post-Op Diagnosis Codes:     * Dysmenorrhea [N94.6]   Procedure: Procedure(s):  DIAGNOSTIC LAPAROSCOPY   Surgeon: Surgeon(s):  Joseph Padilla MD       Anesthesia: General     Estimated Blood Loss:  Minimal   Fluids:  800   mls   UOP:  50   mls   Drains:  None   ABx:  None     Specimens:   None   Findings:  Normal pelvis.  No evidence of endometriosis.   Complications:  None     INDICATION: Yumi Jerry is a 25 y.o. female  with dysmenorrhea and a family history of endometriosis.  PROCEDURE IN DETAIL: After informed consent was obtained, the patient was taken to the operating room, where general anesthesia was administered. She was placed in the lithotomy position in [facility], and her abdomen perineum and vagina were prepped and draped in normal sterile fashion. An open sided speculum was placed in the vagina and her bladder drained with a metal catheter. The anterior lip of the cervix was visualized and grasped with a single toothed tenaculum.  A Whitman cannula was placed in the cervix and attached to the tenaculum.  The speculum was removed.  After changing gloves attention was turned to the abdomen.   The infraumbilical skin was infiltrated with 0.25% MARCAINE solution. A 5 mm vertical incision was made in the umbilicus with a scalpel.  The subcutaneous tissues were divided bluntly.  The abdominal wall was elevated using towel clips and the Veress needle placed in to the peritoneal cavity on first attempt. Correct placement was confirmed by water drop test and measurement of gas pressure and flow. Opening pressure was 5 mmHg.  The abdomen was insufflated with carbon dioxide gas.  The Veress needle was removed and a 5mm tissue  blunt disposable trocar was placed in to the abdomen  while tenting the abdominal wall. The abdomen and pelvis were inspected with the findings noted above. There was no sign of injury from entry.  Careful inspection was then carried out throughout the abdomen and pelvis.  The anterior cul-de-sac, posterior cul-de-sac, and bilateral ovarian fossa were all closely inspected twice without evidence of endometriosis noted.  The fallopian tubes, and fimbriated ends, appear normal.  Although the appendix was not visualized, the remainder of the abdominal and pelvic survey were entirely normal.  The laparoscope was removed and the gas allowed to escape the abdomen through the umbilical port.  The umbilical port was then removed.  The skin incision was then closed with a subcuticular stitch of 3-0 Vicryl Rapide and reinforced with a Steri-Strip.  The instruments were then removed from the vagina and the tenaculum site was hemostatic.     The patient was then awakened and taken to the recovery room in stable condition.  She tolerated the procedure well and without complications.  All sponge needle and instrument counts were correct times 3 per the OR staff.    Joseph Padilla MD   4/21/2021  11:56 CDT

## 2021-04-21 NOTE — ANESTHESIA POSTPROCEDURE EVALUATION
Patient: Yumi Jerry    Procedure Summary     Date: 04/21/21 Room / Location: [facility] OR  /  [facility] OR    Anesthesia Start: 1125 Anesthesia Stop: 1212    Procedure: DIAGNOSTIC LAPAROSCOPY (N/A Abdomen) Diagnosis:       Dysmenorrhea      (Dysmenorrhea [N94.6])    Surgeons: Joseph Padilla MD Provider: Lisa Stoner CRNA    Anesthesia Type: general ASA Status: 1          Anesthesia Type: general    Vitals  Vitals Value Taken Time   /67 04/21/21 1300   Temp 98.8 °F (37.1 °C) 04/21/21 1300   Pulse 66 04/21/21 1302   Resp 14 04/21/21 1300   SpO2 99 % 04/21/21 1302   Vitals shown include unvalidated device data.        Post Anesthesia Care and Evaluation    Patient location during evaluation: PACU  Patient participation: complete - patient participated  Level of consciousness: awake and alert  Pain management: adequate  Airway patency: patent  Anesthetic complications: No anesthetic complications    Cardiovascular status: acceptable  Respiratory status: acceptable  Hydration status: acceptable    Comments: Blood pressure 114/73, pulse 75, temperature 98.8 °F (37.1 °C), temperature source Temporal, resp. rate 14, last menstrual period 03/29/2021, SpO2 99 %, not currently breastfeeding.    Pt discharged from PACU based on feliberto score >8

## 2021-04-21 NOTE — DISCHARGE INSTRUCTIONS

## 2021-07-06 ENCOUNTER — TELEPHONE (OUTPATIENT)
Dept: OBSTETRICS AND GYNECOLOGY | Facility: CLINIC | Age: 26
End: 2021-07-06

## 2021-07-06 NOTE — TELEPHONE ENCOUNTER
PC stating that ever since she had Diag Lap 4/21/21 her periods have been irregular. She got off BCP in 2/21. She's getting  in Sept & they want to start a family soon after. Her periods are coming Q5-6wks. Would like to talk to Dr Padilla about this & run lab tests to see if she's ovulating or has any problems. Transferred pt up front to sched appt to talk.

## 2022-07-05 LAB — EXTERNAL GROUP B STREP ANTIGEN: NEGATIVE

## 2022-07-11 ENCOUNTER — HOSPITAL ENCOUNTER (OUTPATIENT)
Facility: HOSPITAL | Age: 27
Setting detail: OBSERVATION
Discharge: HOME | End: 2022-07-11
Attending: OBSTETRICS & GYNECOLOGY | Admitting: OBSTETRICS & GYNECOLOGY

## 2022-07-11 ENCOUNTER — HOSPITAL ENCOUNTER (OUTPATIENT)
Facility: HOSPITAL | Age: 27
End: 2022-07-11
Attending: OBSTETRICS & GYNECOLOGY | Admitting: OBSTETRICS & GYNECOLOGY

## 2022-07-11 VITALS
TEMPERATURE: 98 F | RESPIRATION RATE: 19 BRPM | HEART RATE: 93 BPM | OXYGEN SATURATION: 99 % | BODY MASS INDEX: 28.17 KG/M2 | SYSTOLIC BLOOD PRESSURE: 129 MMHG | WEIGHT: 190.2 LBS | HEIGHT: 69 IN | DIASTOLIC BLOOD PRESSURE: 85 MMHG

## 2022-07-11 PROBLEM — Z34.90 PREGNANCY: Status: ACTIVE | Noted: 2022-07-11

## 2022-07-11 PROCEDURE — G0378 HOSPITAL OBSERVATION PER HR: HCPCS

## 2022-07-11 PROCEDURE — G0463 HOSPITAL OUTPT CLINIC VISIT: HCPCS

## 2022-07-11 RX ORDER — FOLIC ACID 1 MG/1
1 TABLET ORAL DAILY
COMMUNITY

## 2022-07-11 RX ORDER — VALACYCLOVIR HYDROCHLORIDE 500 MG/1
500 TABLET, FILM COATED ORAL DAILY
Status: ON HOLD | COMMUNITY
End: 2022-08-04

## 2022-07-11 RX ORDER — LANOLIN ALCOHOL/MO/W.PET/CERES
1000 CREAM (GRAM) TOPICAL DAILY
COMMUNITY

## 2022-07-11 RX ORDER — PRENATAL VIT NO.126/IRON/FOLIC 28MG-0.8MG
1 TABLET ORAL DAILY
COMMUNITY

## 2022-07-26 ENCOUNTER — HOSPITAL ENCOUNTER (OUTPATIENT)
Facility: HOSPITAL | Age: 27
Setting detail: OBSERVATION
Discharge: HOME | End: 2022-07-26
Attending: OBSTETRICS & GYNECOLOGY | Admitting: OBSTETRICS & GYNECOLOGY

## 2022-07-26 VITALS
DIASTOLIC BLOOD PRESSURE: 75 MMHG | WEIGHT: 191 LBS | RESPIRATION RATE: 18 BRPM | TEMPERATURE: 97.9 F | BODY MASS INDEX: 28.29 KG/M2 | HEART RATE: 116 BPM | OXYGEN SATURATION: 99 % | SYSTOLIC BLOOD PRESSURE: 128 MMHG | HEIGHT: 69 IN

## 2022-07-26 PROBLEM — O98.919 PREGNANCY AND INFECTIOUS DISEASE: Status: ACTIVE | Noted: 2022-07-26

## 2022-07-26 LAB
ALBUMIN SERPL-MCNC: 3.4 G/DL (ref 3.5–5.2)
ALBUMIN/GLOB SERPL: 1.3 G/DL
ALP SERPL-CCNC: 243 U/L (ref 39–117)
ALT SERPL W P-5'-P-CCNC: 11 U/L (ref 1–33)
ANION GAP SERPL CALCULATED.3IONS-SCNC: 8 MMOL/L (ref 5–15)
AST SERPL-CCNC: 16 U/L (ref 1–32)
B PARAPERT DNA SPEC QL NAA+PROBE: NOT DETECTED
B PERT DNA SPEC QL NAA+PROBE: NOT DETECTED
BILIRUB SERPL-MCNC: 0.4 MG/DL (ref 0–1.2)
BILIRUB UR QL STRIP: NEGATIVE
BUN SERPL-MCNC: 6 MG/DL (ref 6–20)
BUN/CREAT SERPL: 9.7 (ref 7–25)
C PNEUM DNA NPH QL NAA+NON-PROBE: NOT DETECTED
CALCIUM SPEC-SCNC: 8.6 MG/DL (ref 8.6–10.5)
CHLORIDE SERPL-SCNC: 105 MMOL/L (ref 98–107)
CLARITY UR: CLEAR
CLUE CELLS SPEC QL WET PREP: ABNORMAL
CO2 SERPL-SCNC: 24 MMOL/L (ref 22–29)
COLOR UR: YELLOW
CREAT SERPL-MCNC: 0.62 MG/DL (ref 0.57–1)
DEPRECATED RDW RBC AUTO: 49.3 FL (ref 37–54)
EGFRCR SERPLBLD CKD-EPI 2021: 126.9 ML/MIN/1.73
ERYTHROCYTE [DISTWIDTH] IN BLOOD BY AUTOMATED COUNT: 14.6 % (ref 12.3–15.4)
FLUAV SUBTYP SPEC NAA+PROBE: NOT DETECTED
FLUBV RNA ISLT QL NAA+PROBE: NOT DETECTED
GLOBULIN UR ELPH-MCNC: 2.6 GM/DL
GLUCOSE SERPL-MCNC: 89 MG/DL (ref 65–99)
GLUCOSE UR STRIP-MCNC: NEGATIVE MG/DL
HADV DNA SPEC NAA+PROBE: NOT DETECTED
HCOV 229E RNA SPEC QL NAA+PROBE: NOT DETECTED
HCOV HKU1 RNA SPEC QL NAA+PROBE: NOT DETECTED
HCOV NL63 RNA SPEC QL NAA+PROBE: NOT DETECTED
HCOV OC43 RNA SPEC QL NAA+PROBE: NOT DETECTED
HCT VFR BLD AUTO: 27.8 % (ref 34–46.6)
HGB BLD-MCNC: 9.1 G/DL (ref 12–15.9)
HGB UR QL STRIP.AUTO: NEGATIVE
HMPV RNA NPH QL NAA+NON-PROBE: NOT DETECTED
HPIV1 RNA ISLT QL NAA+PROBE: NOT DETECTED
HPIV2 RNA SPEC QL NAA+PROBE: NOT DETECTED
HPIV3 RNA NPH QL NAA+PROBE: NOT DETECTED
HPIV4 P GENE NPH QL NAA+PROBE: NOT DETECTED
HYDATID CYST SPEC WET PREP: ABNORMAL
KETONES UR QL STRIP: NEGATIVE
LEUKOCYTE ESTERASE UR QL STRIP.AUTO: NEGATIVE
M PNEUMO IGG SER IA-ACNC: NOT DETECTED
MCH RBC QN AUTO: 30.7 PG (ref 26.6–33)
MCHC RBC AUTO-ENTMCNC: 32.7 G/DL (ref 31.5–35.7)
MCV RBC AUTO: 93.9 FL (ref 79–97)
NITRITE UR QL STRIP: NEGATIVE
PH UR STRIP.AUTO: 6.5 [PH] (ref 5–8)
PLATELET # BLD AUTO: 166 10*3/MM3 (ref 140–450)
PMV BLD AUTO: 10.4 FL (ref 6–12)
POTASSIUM SERPL-SCNC: 3.8 MMOL/L (ref 3.5–5.2)
PROT SERPL-MCNC: 6 G/DL (ref 6–8.5)
PROT UR QL STRIP: NEGATIVE
RBC # BLD AUTO: 2.96 10*6/MM3 (ref 3.77–5.28)
RHINOVIRUS RNA SPEC NAA+PROBE: NOT DETECTED
RSV RNA NPH QL NAA+NON-PROBE: NOT DETECTED
SARS-COV-2 RNA NPH QL NAA+NON-PROBE: DETECTED
SODIUM SERPL-SCNC: 137 MMOL/L (ref 136–145)
SP GR UR STRIP: 1.01 (ref 1–1.03)
T VAGINALIS SPEC QL WET PREP: ABNORMAL
UROBILINOGEN UR QL STRIP: NORMAL
WBC NRBC COR # BLD: 9.95 10*3/MM3 (ref 3.4–10.8)
WBC SPEC QL WET PREP: ABNORMAL
YEAST GENITAL QL WET PREP: ABNORMAL

## 2022-07-26 PROCEDURE — 96365 THER/PROPH/DIAG IV INF INIT: CPT

## 2022-07-26 PROCEDURE — 81003 URINALYSIS AUTO W/O SCOPE: CPT | Performed by: OBSTETRICS & GYNECOLOGY

## 2022-07-26 PROCEDURE — G0378 HOSPITAL OBSERVATION PER HR: HCPCS

## 2022-07-26 PROCEDURE — 25010000002 IRON SUCROSE PER 1 MG: Performed by: OBSTETRICS & GYNECOLOGY

## 2022-07-26 PROCEDURE — 85027 COMPLETE CBC AUTOMATED: CPT | Performed by: OBSTETRICS & GYNECOLOGY

## 2022-07-26 PROCEDURE — 0202U NFCT DS 22 TRGT SARS-COV-2: CPT | Performed by: OBSTETRICS & GYNECOLOGY

## 2022-07-26 PROCEDURE — 25010000002 ONDANSETRON PER 1 MG: Performed by: OBSTETRICS & GYNECOLOGY

## 2022-07-26 PROCEDURE — 80053 COMPREHEN METABOLIC PANEL: CPT | Performed by: OBSTETRICS & GYNECOLOGY

## 2022-07-26 PROCEDURE — 96368 THER/DIAG CONCURRENT INF: CPT

## 2022-07-26 PROCEDURE — G0463 HOSPITAL OUTPT CLINIC VISIT: HCPCS

## 2022-07-26 PROCEDURE — 87210 SMEAR WET MOUNT SALINE/INK: CPT | Performed by: OBSTETRICS & GYNECOLOGY

## 2022-07-26 PROCEDURE — 51701 INSERT BLADDER CATHETER: CPT

## 2022-07-26 PROCEDURE — 36415 COLL VENOUS BLD VENIPUNCTURE: CPT | Performed by: OBSTETRICS & GYNECOLOGY

## 2022-07-26 RX ADMIN — SODIUM CHLORIDE, POTASSIUM CHLORIDE, SODIUM LACTATE AND CALCIUM CHLORIDE 1000 ML: 600; 310; 30; 20 INJECTION, SOLUTION INTRAVENOUS at 12:36

## 2022-07-26 RX ADMIN — ONDANSETRON 6 MG: 2 INJECTION INTRAMUSCULAR; INTRAVENOUS at 12:47

## 2022-07-26 RX ADMIN — IRON SUCROSE 300 MG: 20 INJECTION, SOLUTION INTRAVENOUS at 12:41

## 2022-07-27 ENCOUNTER — HOSPITAL ENCOUNTER (OUTPATIENT)
Facility: HOSPITAL | Age: 27
Discharge: HOME | End: 2022-07-27
Attending: OBSTETRICS & GYNECOLOGY | Admitting: OBSTETRICS & GYNECOLOGY

## 2022-07-27 VITALS — HEIGHT: 69 IN | WEIGHT: 191 LBS | BODY MASS INDEX: 28.29 KG/M2

## 2022-07-27 PROCEDURE — G0378 HOSPITAL OBSERVATION PER HR: HCPCS

## 2022-07-27 PROCEDURE — 25010000002 IRON SUCROSE PER 1 MG: Performed by: OBSTETRICS & GYNECOLOGY

## 2022-07-27 PROCEDURE — G0463 HOSPITAL OUTPT CLINIC VISIT: HCPCS

## 2022-07-27 RX ADMIN — IRON SUCROSE 300 MG: 20 INJECTION, SOLUTION INTRAVENOUS at 13:13

## 2022-07-29 ENCOUNTER — HOSPITAL ENCOUNTER (OUTPATIENT)
Facility: HOSPITAL | Age: 27
Discharge: HOME | End: 2022-07-29
Attending: OBSTETRICS & GYNECOLOGY | Admitting: OBSTETRICS & GYNECOLOGY

## 2022-07-29 VITALS
RESPIRATION RATE: 18 BRPM | HEART RATE: 74 BPM | DIASTOLIC BLOOD PRESSURE: 74 MMHG | OXYGEN SATURATION: 100 % | SYSTOLIC BLOOD PRESSURE: 115 MMHG

## 2022-07-29 PROCEDURE — G0463 HOSPITAL OUTPT CLINIC VISIT: HCPCS

## 2022-07-29 PROCEDURE — 25010000002 IRON SUCROSE PER 1 MG: Performed by: OBSTETRICS & GYNECOLOGY

## 2022-07-29 PROCEDURE — G0378 HOSPITAL OBSERVATION PER HR: HCPCS

## 2022-07-29 RX ADMIN — IRON SUCROSE 300 MG: 20 INJECTION, SOLUTION INTRAVENOUS at 10:57

## 2022-08-03 ENCOUNTER — HOSPITAL ENCOUNTER (INPATIENT)
Facility: HOSPITAL | Age: 27
LOS: 3 days | Discharge: HOME | End: 2022-08-06
Attending: OBSTETRICS & GYNECOLOGY | Admitting: OBSTETRICS & GYNECOLOGY

## 2022-08-03 DIAGNOSIS — Z37.9 NORMAL LABOR: Primary | ICD-10-CM

## 2022-08-03 LAB
ABO GROUP BLD: NORMAL
BASOPHILS # BLD AUTO: 0.02 10*3/MM3 (ref 0–0.2)
BASOPHILS NFR BLD AUTO: 0.1 % (ref 0–1.5)
BLD GP AB SCN SERPL QL: NEGATIVE
DEPRECATED RDW RBC AUTO: 53.2 FL (ref 37–54)
EOSINOPHIL # BLD AUTO: 0.15 10*3/MM3 (ref 0–0.4)
EOSINOPHIL NFR BLD AUTO: 1.1 % (ref 0.3–6.2)
ERYTHROCYTE [DISTWIDTH] IN BLOOD BY AUTOMATED COUNT: 16.5 % (ref 12.3–15.4)
HCT VFR BLD AUTO: 32.5 % (ref 34–46.6)
HGB BLD-MCNC: 11 G/DL (ref 12–15.9)
IMM GRANULOCYTES # BLD AUTO: 0.18 10*3/MM3 (ref 0–0.05)
IMM GRANULOCYTES NFR BLD AUTO: 1.3 % (ref 0–0.5)
LYMPHOCYTES # BLD AUTO: 1.46 10*3/MM3 (ref 0.7–3.1)
LYMPHOCYTES NFR BLD AUTO: 10.8 % (ref 19.6–45.3)
MCH RBC QN AUTO: 31.7 PG (ref 26.6–33)
MCHC RBC AUTO-ENTMCNC: 33.8 G/DL (ref 31.5–35.7)
MCV RBC AUTO: 93.7 FL (ref 79–97)
MONOCYTES # BLD AUTO: 0.65 10*3/MM3 (ref 0.1–0.9)
MONOCYTES NFR BLD AUTO: 4.8 % (ref 5–12)
NEUTROPHILS NFR BLD AUTO: 11.11 10*3/MM3 (ref 1.7–7)
NEUTROPHILS NFR BLD AUTO: 81.9 % (ref 42.7–76)
NRBC BLD AUTO-RTO: 0 /100 WBC (ref 0–0.2)
PLATELET # BLD AUTO: 162 10*3/MM3 (ref 140–450)
PMV BLD AUTO: 11 FL (ref 6–12)
RBC # BLD AUTO: 3.47 10*6/MM3 (ref 3.77–5.28)
RH BLD: POSITIVE
T&S EXPIRATION DATE: NORMAL
WBC NRBC COR # BLD: 13.57 10*3/MM3 (ref 3.4–10.8)

## 2022-08-03 PROCEDURE — 25010000002 ONDANSETRON PER 1 MG: Performed by: OBSTETRICS & GYNECOLOGY

## 2022-08-03 PROCEDURE — 86900 BLOOD TYPING SEROLOGIC ABO: CPT | Performed by: OBSTETRICS & GYNECOLOGY

## 2022-08-03 PROCEDURE — 86901 BLOOD TYPING SEROLOGIC RH(D): CPT | Performed by: OBSTETRICS & GYNECOLOGY

## 2022-08-03 PROCEDURE — 36415 COLL VENOUS BLD VENIPUNCTURE: CPT | Performed by: OBSTETRICS & GYNECOLOGY

## 2022-08-03 PROCEDURE — 25010000002 BUTORPHANOL PER 1 MG: Performed by: OBSTETRICS & GYNECOLOGY

## 2022-08-03 PROCEDURE — 85025 COMPLETE CBC W/AUTO DIFF WBC: CPT | Performed by: OBSTETRICS & GYNECOLOGY

## 2022-08-03 PROCEDURE — 86850 RBC ANTIBODY SCREEN: CPT | Performed by: OBSTETRICS & GYNECOLOGY

## 2022-08-03 RX ORDER — ACETAMINOPHEN 325 MG/1
650 TABLET ORAL EVERY 4 HOURS PRN
Status: DISCONTINUED | OUTPATIENT
Start: 2022-08-03 | End: 2022-08-04 | Stop reason: HOSPADM

## 2022-08-03 RX ORDER — SODIUM CHLORIDE, SODIUM LACTATE, POTASSIUM CHLORIDE, CALCIUM CHLORIDE 600; 310; 30; 20 MG/100ML; MG/100ML; MG/100ML; MG/100ML
125 INJECTION, SOLUTION INTRAVENOUS CONTINUOUS
Status: DISCONTINUED | OUTPATIENT
Start: 2022-08-03 | End: 2022-08-04

## 2022-08-03 RX ORDER — CLINDAMYCIN HYDROCHLORIDE 300 MG/1
300 CAPSULE ORAL 4 TIMES DAILY
Status: ON HOLD | COMMUNITY
End: 2022-08-04

## 2022-08-03 RX ORDER — ONDANSETRON 4 MG/1
4 TABLET, FILM COATED ORAL EVERY 6 HOURS PRN
Status: DISCONTINUED | OUTPATIENT
Start: 2022-08-03 | End: 2022-08-04 | Stop reason: HOSPADM

## 2022-08-03 RX ORDER — SODIUM CHLORIDE 0.9 % (FLUSH) 0.9 %
10 SYRINGE (ML) INJECTION EVERY 12 HOURS SCHEDULED
Status: DISCONTINUED | OUTPATIENT
Start: 2022-08-03 | End: 2022-08-04

## 2022-08-03 RX ORDER — SODIUM CHLORIDE 0.9 % (FLUSH) 0.9 %
10 SYRINGE (ML) INJECTION AS NEEDED
Status: DISCONTINUED | OUTPATIENT
Start: 2022-08-03 | End: 2022-08-04

## 2022-08-03 RX ORDER — LIDOCAINE HYDROCHLORIDE 10 MG/ML
5 INJECTION, SOLUTION EPIDURAL; INFILTRATION; INTRACAUDAL; PERINEURAL AS NEEDED
Status: DISCONTINUED | OUTPATIENT
Start: 2022-08-03 | End: 2022-08-04 | Stop reason: HOSPADM

## 2022-08-03 RX ORDER — BUTORPHANOL TARTRATE 1 MG/ML
1 INJECTION, SOLUTION INTRAMUSCULAR; INTRAVENOUS
Status: DISCONTINUED | OUTPATIENT
Start: 2022-08-03 | End: 2022-08-04 | Stop reason: HOSPADM

## 2022-08-03 RX ORDER — ONDANSETRON 2 MG/ML
4 INJECTION INTRAMUSCULAR; INTRAVENOUS EVERY 6 HOURS PRN
Status: DISCONTINUED | OUTPATIENT
Start: 2022-08-03 | End: 2022-08-04 | Stop reason: HOSPADM

## 2022-08-03 RX ADMIN — Medication 10 ML: at 21:10

## 2022-08-03 RX ADMIN — SODIUM CHLORIDE, POTASSIUM CHLORIDE, SODIUM LACTATE AND CALCIUM CHLORIDE 125 ML/HR: 600; 310; 30; 20 INJECTION, SOLUTION INTRAVENOUS at 22:59

## 2022-08-03 RX ADMIN — ONDANSETRON 4 MG: 2 INJECTION INTRAMUSCULAR; INTRAVENOUS at 22:50

## 2022-08-03 RX ADMIN — SODIUM CHLORIDE, POTASSIUM CHLORIDE, SODIUM LACTATE AND CALCIUM CHLORIDE 1000 ML: 600; 310; 30; 20 INJECTION, SOLUTION INTRAVENOUS at 20:35

## 2022-08-03 RX ADMIN — BUTORPHANOL TARTRATE 1 MG: 1 INJECTION, SOLUTION INTRAMUSCULAR; INTRAVENOUS at 23:29

## 2022-08-04 ENCOUNTER — ANESTHESIA (OUTPATIENT)
Dept: LABOR AND DELIVERY | Facility: HOSPITAL | Age: 27
End: 2022-08-04

## 2022-08-04 ENCOUNTER — ANESTHESIA EVENT (OUTPATIENT)
Dept: LABOR AND DELIVERY | Facility: HOSPITAL | Age: 27
End: 2022-08-04

## 2022-08-04 PROBLEM — Z34.90 PREGNANCY: Status: RESOLVED | Noted: 2022-07-11 | Resolved: 2022-08-04

## 2022-08-04 PROBLEM — N94.6 DYSMENORRHEA: Status: RESOLVED | Noted: 2021-04-13 | Resolved: 2022-08-04

## 2022-08-04 PROBLEM — Z37.9 NORMAL LABOR: Status: RESOLVED | Noted: 2022-08-03 | Resolved: 2022-08-04

## 2022-08-04 PROBLEM — O98.919 PREGNANCY AND INFECTIOUS DISEASE: Status: RESOLVED | Noted: 2022-07-26 | Resolved: 2022-08-04

## 2022-08-04 PROCEDURE — 0KQM0ZZ REPAIR PERINEUM MUSCLE, OPEN APPROACH: ICD-10-PCS | Performed by: OBSTETRICS & GYNECOLOGY

## 2022-08-04 PROCEDURE — 88307 TISSUE EXAM BY PATHOLOGIST: CPT | Performed by: OBSTETRICS & GYNECOLOGY

## 2022-08-04 PROCEDURE — C1755 CATHETER, INTRASPINAL: HCPCS | Performed by: NURSE ANESTHETIST, CERTIFIED REGISTERED

## 2022-08-04 PROCEDURE — 25010000002 ROPIVACAINE PER 1 MG: Performed by: NURSE ANESTHETIST, CERTIFIED REGISTERED

## 2022-08-04 PROCEDURE — 51701 INSERT BLADDER CATHETER: CPT

## 2022-08-04 PROCEDURE — 25010000002 BUTORPHANOL PER 1 MG: Performed by: OBSTETRICS & GYNECOLOGY

## 2022-08-04 PROCEDURE — 25010000002 ONDANSETRON PER 1 MG: Performed by: OBSTETRICS & GYNECOLOGY

## 2022-08-04 RX ORDER — METHYLERGONOVINE MALEATE 0.2 MG/ML
200 INJECTION INTRAVENOUS ONCE AS NEEDED
Status: DISCONTINUED | OUTPATIENT
Start: 2022-08-04 | End: 2022-08-04 | Stop reason: HOSPADM

## 2022-08-04 RX ORDER — ONDANSETRON 2 MG/ML
4 INJECTION INTRAMUSCULAR; INTRAVENOUS EVERY 6 HOURS PRN
Status: DISCONTINUED | OUTPATIENT
Start: 2022-08-04 | End: 2022-08-06 | Stop reason: HOSPADM

## 2022-08-04 RX ORDER — ONDANSETRON 4 MG/1
4 TABLET, FILM COATED ORAL EVERY 6 HOURS PRN
Status: DISCONTINUED | OUTPATIENT
Start: 2022-08-04 | End: 2022-08-04 | Stop reason: HOSPADM

## 2022-08-04 RX ORDER — PRENATAL VIT/IRON FUM/FOLIC AC 27MG-0.8MG
1 TABLET ORAL DAILY
Status: DISCONTINUED | OUTPATIENT
Start: 2022-08-04 | End: 2022-08-06 | Stop reason: HOSPADM

## 2022-08-04 RX ORDER — IBUPROFEN 800 MG/1
800 TABLET ORAL EVERY 8 HOURS PRN
Status: DISCONTINUED | OUTPATIENT
Start: 2022-08-04 | End: 2022-08-04 | Stop reason: HOSPADM

## 2022-08-04 RX ORDER — DOCUSATE SODIUM 100 MG/1
100 CAPSULE, LIQUID FILLED ORAL 2 TIMES DAILY
Status: DISCONTINUED | OUTPATIENT
Start: 2022-08-04 | End: 2022-08-06 | Stop reason: HOSPADM

## 2022-08-04 RX ORDER — OXYCODONE HYDROCHLORIDE AND ACETAMINOPHEN 5; 325 MG/1; MG/1
1 TABLET ORAL EVERY 6 HOURS PRN
Status: DISCONTINUED | OUTPATIENT
Start: 2022-08-04 | End: 2022-08-06 | Stop reason: HOSPADM

## 2022-08-04 RX ORDER — BISACODYL 10 MG
10 SUPPOSITORY, RECTAL RECTAL DAILY PRN
Status: DISCONTINUED | OUTPATIENT
Start: 2022-08-05 | End: 2022-08-06 | Stop reason: HOSPADM

## 2022-08-04 RX ORDER — SODIUM CHLORIDE, SODIUM LACTATE, POTASSIUM CHLORIDE, CALCIUM CHLORIDE 600; 310; 30; 20 MG/100ML; MG/100ML; MG/100ML; MG/100ML
125 INJECTION, SOLUTION INTRAVENOUS CONTINUOUS
Status: DISCONTINUED | OUTPATIENT
Start: 2022-08-04 | End: 2022-08-06 | Stop reason: HOSPADM

## 2022-08-04 RX ORDER — IBUPROFEN 800 MG/1
800 TABLET ORAL EVERY 8 HOURS PRN
Status: DISCONTINUED | OUTPATIENT
Start: 2022-08-04 | End: 2022-08-06 | Stop reason: HOSPADM

## 2022-08-04 RX ORDER — ROPIVACAINE HYDROCHLORIDE 5 MG/ML
INJECTION, SOLUTION EPIDURAL; INFILTRATION; PERINEURAL AS NEEDED
Status: DISCONTINUED | OUTPATIENT
Start: 2022-08-04 | End: 2022-08-04 | Stop reason: SURG

## 2022-08-04 RX ORDER — TRISODIUM CITRATE DIHYDRATE AND CITRIC ACID MONOHYDRATE 500; 334 MG/5ML; MG/5ML
30 SOLUTION ORAL ONCE
Status: DISCONTINUED | OUTPATIENT
Start: 2022-08-04 | End: 2022-08-04 | Stop reason: HOSPADM

## 2022-08-04 RX ORDER — ONDANSETRON 2 MG/ML
4 INJECTION INTRAMUSCULAR; INTRAVENOUS EVERY 6 HOURS PRN
Status: DISCONTINUED | OUTPATIENT
Start: 2022-08-04 | End: 2022-08-04 | Stop reason: HOSPADM

## 2022-08-04 RX ORDER — OXYTOCIN/0.9 % SODIUM CHLORIDE 30/500 ML
999 PLASTIC BAG, INJECTION (ML) INTRAVENOUS ONCE
Status: COMPLETED | OUTPATIENT
Start: 2022-08-04 | End: 2022-08-04

## 2022-08-04 RX ORDER — ONDANSETRON 4 MG/1
4 TABLET, FILM COATED ORAL EVERY 6 HOURS PRN
Status: DISCONTINUED | OUTPATIENT
Start: 2022-08-04 | End: 2022-08-06 | Stop reason: HOSPADM

## 2022-08-04 RX ORDER — OXYTOCIN/0.9 % SODIUM CHLORIDE 30/500 ML
2-20 PLASTIC BAG, INJECTION (ML) INTRAVENOUS
Status: DISCONTINUED | OUTPATIENT
Start: 2022-08-04 | End: 2022-08-04

## 2022-08-04 RX ORDER — ROPIVACAINE HYDROCHLORIDE 2 MG/ML
INJECTION, SOLUTION EPIDURAL; INFILTRATION; PERINEURAL AS NEEDED
Status: DISCONTINUED | OUTPATIENT
Start: 2022-08-04 | End: 2022-08-04 | Stop reason: SURG

## 2022-08-04 RX ORDER — ONDANSETRON 2 MG/ML
4 INJECTION INTRAMUSCULAR; INTRAVENOUS EVERY 6 HOURS PRN
Status: DISCONTINUED | OUTPATIENT
Start: 2022-08-04 | End: 2022-08-04 | Stop reason: SDUPTHER

## 2022-08-04 RX ORDER — CARBOPROST TROMETHAMINE 250 UG/ML
250 INJECTION, SOLUTION INTRAMUSCULAR ONCE AS NEEDED
Status: DISCONTINUED | OUTPATIENT
Start: 2022-08-04 | End: 2022-08-06 | Stop reason: HOSPADM

## 2022-08-04 RX ORDER — LIDOCAINE HYDROCHLORIDE 20 MG/ML
20 INJECTION, SOLUTION INFILTRATION; PERINEURAL ONCE
Status: DISCONTINUED | OUTPATIENT
Start: 2022-08-04 | End: 2022-08-04

## 2022-08-04 RX ORDER — PROMETHAZINE HYDROCHLORIDE 25 MG/1
12.5 TABLET ORAL EVERY 6 HOURS PRN
Status: DISCONTINUED | OUTPATIENT
Start: 2022-08-04 | End: 2022-08-04 | Stop reason: HOSPADM

## 2022-08-04 RX ORDER — PROMETHAZINE HYDROCHLORIDE 12.5 MG/1
12.5 SUPPOSITORY RECTAL EVERY 6 HOURS PRN
Status: DISCONTINUED | OUTPATIENT
Start: 2022-08-04 | End: 2022-08-04 | Stop reason: HOSPADM

## 2022-08-04 RX ORDER — MISOPROSTOL 200 UG/1
800 TABLET ORAL ONCE AS NEEDED
Status: DISCONTINUED | OUTPATIENT
Start: 2022-08-04 | End: 2022-08-04 | Stop reason: HOSPADM

## 2022-08-04 RX ORDER — SODIUM CHLORIDE 0.9 % (FLUSH) 0.9 %
1-10 SYRINGE (ML) INJECTION AS NEEDED
Status: DISCONTINUED | OUTPATIENT
Start: 2022-08-04 | End: 2022-08-06 | Stop reason: HOSPADM

## 2022-08-04 RX ORDER — EPHEDRINE SULFATE 50 MG/ML
10 INJECTION, SOLUTION INTRAVENOUS
Status: DISCONTINUED | OUTPATIENT
Start: 2022-08-04 | End: 2022-08-04 | Stop reason: HOSPADM

## 2022-08-04 RX ORDER — MISOPROSTOL 200 UG/1
600 TABLET ORAL ONCE AS NEEDED
Status: DISCONTINUED | OUTPATIENT
Start: 2022-08-04 | End: 2022-08-06 | Stop reason: HOSPADM

## 2022-08-04 RX ORDER — OXYTOCIN/0.9 % SODIUM CHLORIDE 30/500 ML
250 PLASTIC BAG, INJECTION (ML) INTRAVENOUS CONTINUOUS PRN
Status: ACTIVE | OUTPATIENT
Start: 2022-08-04 | End: 2022-08-04

## 2022-08-04 RX ORDER — OXYTOCIN/0.9 % SODIUM CHLORIDE 30/500 ML
125 PLASTIC BAG, INJECTION (ML) INTRAVENOUS CONTINUOUS PRN
Status: DISCONTINUED | OUTPATIENT
Start: 2022-08-04 | End: 2022-08-06 | Stop reason: HOSPADM

## 2022-08-04 RX ORDER — METHYLERGONOVINE MALEATE 0.2 MG/ML
200 INJECTION INTRAVENOUS ONCE AS NEEDED
Status: DISCONTINUED | OUTPATIENT
Start: 2022-08-04 | End: 2022-08-06 | Stop reason: HOSPADM

## 2022-08-04 RX ORDER — PROMETHAZINE HYDROCHLORIDE 12.5 MG/1
12.5 SUPPOSITORY RECTAL EVERY 6 HOURS PRN
Status: DISCONTINUED | OUTPATIENT
Start: 2022-08-04 | End: 2022-08-06 | Stop reason: HOSPADM

## 2022-08-04 RX ORDER — CARBOPROST TROMETHAMINE 250 UG/ML
250 INJECTION, SOLUTION INTRAMUSCULAR
Status: DISCONTINUED | OUTPATIENT
Start: 2022-08-04 | End: 2022-08-04 | Stop reason: HOSPADM

## 2022-08-04 RX ORDER — PROMETHAZINE HYDROCHLORIDE 25 MG/1
25 TABLET ORAL EVERY 6 HOURS PRN
Status: DISCONTINUED | OUTPATIENT
Start: 2022-08-04 | End: 2022-08-06 | Stop reason: HOSPADM

## 2022-08-04 RX ORDER — HYDROCORTISONE 25 MG/G
1 CREAM TOPICAL AS NEEDED
Status: DISCONTINUED | OUTPATIENT
Start: 2022-08-04 | End: 2022-08-06 | Stop reason: HOSPADM

## 2022-08-04 RX ORDER — FAMOTIDINE 10 MG/ML
20 INJECTION, SOLUTION INTRAVENOUS ONCE AS NEEDED
Status: CANCELLED | OUTPATIENT
Start: 2022-08-04

## 2022-08-04 RX ADMIN — ROPIVACAINE HYDROCHLORIDE 5 ML: 2 INJECTION, SOLUTION EPIDURAL; INFILTRATION at 03:44

## 2022-08-04 RX ADMIN — SODIUM CHLORIDE, POTASSIUM CHLORIDE, SODIUM LACTATE AND CALCIUM CHLORIDE 125 ML/HR: 600; 310; 30; 20 INJECTION, SOLUTION INTRAVENOUS at 03:56

## 2022-08-04 RX ADMIN — OXYTOCIN-SODIUM CHLORIDE 0.9% IV SOLN 30 UNIT/500ML 2 MILLI-UNITS/MIN: 30-0.9/5 SOLUTION at 06:56

## 2022-08-04 RX ADMIN — OXYTOCIN-SODIUM CHLORIDE 0.9% IV SOLN 30 UNIT/500ML 250 MILLI-UNITS/MIN: 30-0.9/5 SOLUTION at 12:03

## 2022-08-04 RX ADMIN — ROPIVACAINE HYDROCHLORIDE 5 ML: 5 INJECTION, SOLUTION EPIDURAL; INFILTRATION; PERINEURAL at 03:44

## 2022-08-04 RX ADMIN — Medication: at 15:12

## 2022-08-04 RX ADMIN — ROPIVACAINE HYDROCHLORIDE 4 ML/HR: 2 INJECTION, SOLUTION EPIDURAL; INFILTRATION at 03:49

## 2022-08-04 RX ADMIN — DOCUSATE SODIUM 100 MG: 100 CAPSULE, LIQUID FILLED ORAL at 21:02

## 2022-08-04 RX ADMIN — OXYTOCIN-SODIUM CHLORIDE 0.9% IV SOLN 30 UNIT/500ML 999 ML/HR: 30-0.9/5 SOLUTION at 11:09

## 2022-08-04 RX ADMIN — IBUPROFEN 800 MG: 800 TABLET, FILM COATED ORAL at 21:02

## 2022-08-04 RX ADMIN — SODIUM CHLORIDE, POTASSIUM CHLORIDE, SODIUM LACTATE AND CALCIUM CHLORIDE 125 ML/HR: 600; 310; 30; 20 INJECTION, SOLUTION INTRAVENOUS at 10:27

## 2022-08-04 RX ADMIN — BUTORPHANOL TARTRATE 1 MG: 1 INJECTION, SOLUTION INTRAMUSCULAR; INTRAVENOUS at 02:40

## 2022-08-04 RX ADMIN — IBUPROFEN 800 MG: 800 TABLET, FILM COATED ORAL at 11:25

## 2022-08-04 RX ADMIN — EPHEDRINE SULFATE 10 MG: 50 INJECTION INTRAVENOUS at 04:25

## 2022-08-04 RX ADMIN — ONDANSETRON 4 MG: 2 INJECTION INTRAMUSCULAR; INTRAVENOUS at 04:25

## 2022-08-04 RX ADMIN — SODIUM CHLORIDE, POTASSIUM CHLORIDE, SODIUM LACTATE AND CALCIUM CHLORIDE 1000 ML: 600; 310; 30; 20 INJECTION, SOLUTION INTRAVENOUS at 03:21

## 2022-08-04 NOTE — PLAN OF CARE
Problem: Adult Inpatient Plan of Care  Goal: Plan of Care Review  Outcome: Ongoing, Progressing  Flowsheets (Taken 8/4/2022 1219)  Progress: improving  Plan of Care Reviewed With:   patient   significant other  Outcome Evaluation: SVE under epidural anesthesia @1101, 2nd degree lac with repair, pt will breastfeed, pain med given.  Goal: Patient-Specific Goal (Individualized)  Outcome: Ongoing, Progressing  Flowsheets (Taken 8/4/2022 1219)  Patient-Specific Goals (Include Timeframe): Pt will breastfeed within 1st hour after delivery.  Anxieties, Fears or Concerns: pt denies  Goal: Absence of Hospital-Acquired Illness or Injury  Outcome: Ongoing, Progressing  Intervention: Identify and Manage Fall Risk  Description: Perform standard risk assessment on admission using a validated tool or comprehensive approach appropriate to the patient; reassess fall risk frequently, with change in status or transfer to another level of care.  Communicate fall injury risk to interprofessional healthcare team.  Determine need for increased observation, equipment and environmental modification, such as low bed, signage and supportive, nonskid footwear.  Adjust safety measures to individual developmental age, stage and identified risk factors.  Reinforce the importance of safety and physical activity with patient and family.  Perform regular intentional rounding to assess need for position change, pain assessment and personal needs, including assistance with toileting.  Recent Flowsheet Documentation  Taken 8/4/2022 0715 by Rosalva Zepeda RN  Safety Promotion/Fall Prevention:   safety round/check completed   assistive device/personal items within reach   clutter free environment maintained  Intervention: Prevent Skin Injury  Description: Perform a screening for skin injury risk, such as pressure or moisture associated skin damage on admission and at regular intervals throughout hospital stay.  Keep all areas of skin (especially  folds) clean and dry.  Maintain adequate skin hydration.  Relieve and redistribute pressure and protect bony prominences; implement measures based on patient-specific risk factors.  Match turning and repositioning schedule to clinical condition.  Encourage weight shift frequently; assist with reposition if unable to complete independently.  Float heels off bed; avoid pressure on the Achilles tendon.  Keep skin free from extended contact with medical devices.  Encourage functional activity and mobility, as early as tolerated.  Use aids (e.g., slide boards, mechanical lift) during transfer.  Recent Flowsheet Documentation  Taken 8/4/2022 1130 by Rosalva Zepeda, RN  Body Position: sitting up in bed  Taken 8/4/2022 0715 by Rosalva Zepeda, RN  Body Position:   supine   tilted  Intervention: Prevent and Manage VTE (Venous Thromboembolism) Risk  Description: Assess for VTE (venous thromboembolism) risk.  Encourage and assist with early ambulation.  Initiate and maintain compression or other therapy, as indicated, based on identified risk in accordance with organizational protocol and provider order.  Encourage both active and passive leg exercises while in bed, if unable to ambulate.  Recent Flowsheet Documentation  Taken 8/4/2022 1130 by Rosalva Zepeda, RN  Activity Management: (due to epidural) bedrest  Taken 8/4/2022 0715 by Rosalva Zepeda, RN  Activity Management: (due to epidural) bedrest  Goal: Optimal Comfort and Wellbeing  Outcome: Ongoing, Progressing  Intervention: Provide Person-Centered Care  Description: Use a family-focused approach to care.  Develop trust and rapport by proactively providing information, encouraging questions, addressing concerns and offering reassurance.  Acknowledge emotional response to hospitalization.  Recognize and utilize personal coping strategies.  Honor spiritual and cultural preferences.  Recent Flowsheet Documentation  Taken 8/4/2022 1130 by Rosalva Zepeda, RN  Cheng  Relationship/Rapport:   care explained   choices provided   thoughts/feelings acknowledged   reassurance provided   questions encouraged  Taken 8/4/2022 0715 by Rosalva Zepeda RN  Trust Relationship/Rapport:   care explained   choices provided   thoughts/feelings acknowledged   reassurance provided   questions encouraged  Goal: Readiness for Transition of Care  Outcome: Ongoing, Progressing  Intervention: Mutually Develop Transition Plan  Description: Identify available resources for support (e.g., family, friends, community).  Identify and address barriers to ongoing treatment and home management (e.g., environmental, financial).  Provide opportunities to practice self-management skills.  Assess and monitor emotional readiness for transition.  Establish or reconnect linkage with outpatient providers or community-based services.  Recent Flowsheet Documentation  Taken 8/4/2022 1145 by Rosalva Zepeda, RN  Transportation Anticipated: car, drives self  Patient/Family Anticipated Services at Transition: none  Patient/Family Anticipates Transition to: home

## 2022-08-04 NOTE — PLAN OF CARE
Goal Outcome Evaluation:  V/s WNL, FF/ml/u2 scant lochia, voiding, showered, ambulating in [place].  Perineal edema and laceration. Comfort products in room and in use. Breastfeeding well and bonding with baby.

## 2022-08-04 NOTE — LACTATION NOTE
Mother's Name: Yumi  Phone #: [phone]  Infant Name: Olegario  : 22  Gestation: 39w4d  Day of life:   Birth weight:  7-11.8 (3510g)Discharge weight:  Weight Loss:   24 hour Summary of Feeds:  Voids:  Stools:  Assistive devices (shields, shells, etc): None  Significant Maternal history: , HSV  Maternal Concerns: None at this time   Maternal Goal: Breastfeed  Mother's Medications: PNV, Folvite, Clindamycin, Tylenol  Breastpump for home: Yes  Ped follow up appt:    Patient reports infant latched and breast fed well after delivery. States she feels confident as she used to help new mothers breastfeed. 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 Initial breastfeeding book and packet provided. Discussed signs of effective milk transfer, skin to skin, and hand expressing. Recommended she watch educational videos on list provided. Lactation support offered. Questions denied.     Instructed mom our lactation team is here for continued support throughout their breastfeeding journey. Our team has encouraged mom to call with any questions or concerns that may arise after discharge.

## 2022-08-04 NOTE — L&D DELIVERY NOTE
[facility]  Vaginal Delivery Note    Delivery details     Delivery: Vaginal, Spontaneous     YOB: 2021    Time of Birth: 11:01 AM      Anesthesia: Epidural     Delivering clinician: Glendy Andrade    Forceps?   No   Vacuum? No    Shoulder dystocia present: No      Delivery narrative:      Infant details    Findings: male  infant     Infant observations: Weight: 3510 g (7 lb 11.8 oz)   Length: 21  in   Apgars: 8  @ 1 minute /    9  @ 5 minutes     Placenta, Cord, and Fluid    Placenta delivered  Spontaneous  at   2022 11:05 AM     Cord: 3 vessels  present.   Nuchal Cord?  yes; Number of nuchal loops present:  1    Cord blood obtained: Yes      Repair    Episiotomy: None    Lacerations: Yes  Laceration Information  Laceration Repaired?   Perineal: 2nd  Yes    Periurethral:       Labial:       Sulcus:       Vaginal:       Cervical:         Suture used for repair: 2-0 Vicryl   Estimated Blood Loss:     Suture used for repair: 2-0 Vicryl       Complications  Tight nuchal cord    Disposition  Mother to Mother Baby/Postpartum  in stable condition currently.  Baby to remains with mom  in stable condition currently.      Glendy Andrade MD  22  12:28 CDT

## 2022-08-04 NOTE — ANESTHESIA PREPROCEDURE EVALUATION
Anesthesia Evaluation     history of anesthetic complications: PONV  NPO Solid Status: > 8 hours  NPO Liquid Status: > 4 hours           Airway   Mallampati: II  TM distance: >3 FB  Neck ROM: full  No difficulty expected  Dental - normal exam     Pulmonary    Cardiovascular   Exercise tolerance: excellent (>7 METS)        Neuro/Psych  GI/Hepatic/Renal/Endo      Musculoskeletal     Abdominal    Substance History      OB/GYN    (+) Pregnant,         Other                        Anesthesia Plan    ASA 1     epidural       Anesthetic plan, risks, benefits, and alternatives have been provided, discussed and informed consent has been obtained with: patient.        CODE STATUS:

## 2022-08-04 NOTE — ANESTHESIA PROCEDURE NOTES
Labor Epidural      Patient reassessed immediately prior to procedure    Patient location during procedure: OB  Indication:at surgeon's request  Performed By  CRNA/CAA: Miller Erickson CRNA  Preanesthetic Checklist  Completed: patient identified, IV checked, site marked, risks and benefits discussed, surgical consent, monitors and equipment checked, pre-op evaluation and timeout performed  Prep:  Pt Position:sitting  Sterile Tech:cap, gloves, mask and sterile barrier  Prep:chlorhexidine gluconate and isopropyl alcohol  Monitoring:continuous pulse oximetry and blood pressure monitoring  Epidural Block Procedure:  Approach:midline  Guidance:landmark technique and palpation technique  Location:L2-L3  Needle Type:Tuohy  Needle Gauge:18 G  Loss of Resistance Medium: saline  Loss of Resistance: 7cm  Cath Depth at skin:15 cm  Paresthesia: none  Aspiration:negative  Test Dose:negative  Med administered at 8/4/2022 3:41 AM  Number of Attempts: 2  Post Assessment:  Dressing:occlusive dressing applied and secured with tape  Pt Tolerance:patient tolerated the procedure well with no apparent complications  Complications:no

## 2022-08-05 LAB
HCT VFR BLD AUTO: 27.8 % (ref 34–46.6)
HGB BLD-MCNC: 9.1 G/DL (ref 12–15.9)

## 2022-08-05 PROCEDURE — 85014 HEMATOCRIT: CPT | Performed by: OBSTETRICS & GYNECOLOGY

## 2022-08-05 PROCEDURE — 85018 HEMOGLOBIN: CPT | Performed by: OBSTETRICS & GYNECOLOGY

## 2022-08-05 RX ADMIN — DOCUSATE SODIUM 100 MG: 100 CAPSULE, LIQUID FILLED ORAL at 20:39

## 2022-08-05 RX ADMIN — IBUPROFEN 800 MG: 800 TABLET, FILM COATED ORAL at 05:03

## 2022-08-05 RX ADMIN — DOCUSATE SODIUM 100 MG: 100 CAPSULE, LIQUID FILLED ORAL at 08:08

## 2022-08-05 RX ADMIN — IBUPROFEN 800 MG: 800 TABLET, FILM COATED ORAL at 12:56

## 2022-08-05 NOTE — PROGRESS NOTES
"[facility]  Vaginal Delivery Progress Note    Subjective   Postpartum Day 1: Vaginal Delivery    The patient feels well.  Her pain is well controlled with prescribed pain medications.   She is ambulating well.  Patient describes her bleeding as moderate lochia.    Breastfeeding: without difficulty.    Objective     Vital Signs Range for the last 24 hours  Temperature: Temp:  [96.8 °F (36 °C)-98.1 °F (36.7 °C)] 97.4 °F (36.3 °C)   Temp Source: Temp src: Temporal   BP: BP: (106-138)/(58-91) 108/81   Pulse: Heart Rate:  [68-95] 81   Respirations: Resp:  [16-18] 18   SPO2: SpO2:  [97 %-99 %] 97 %   O2 Amount (l/min):     O2 Devices Device (Oxygen Therapy): room air   Weight:       Admit Height:  Height: 175.3 cm (69\")      Physical Exam:  General:  no acute distresss.  Abdomen: Fundus: appropriate, firm, non tender  Extremities: normal, atraumatic, no cyanosis, and trace edema.     Lab results reviewed:  Yes   Rubella:  No results found for: RUBELLAIGGIN Nurse Transcribed from prenatal record --  No components found for: EXTRUBELQUAL  Rh Status:    RH type   Date Value Ref Range Status   08/03/2022 Positive  Final     Immunizations:   Immunization History   Administered Date(s) Administered   • Flu Vaccine Split Quad 11/28/2016, 10/13/2017, 10/11/2018   • Hepatitis B 11/08/2016, 12/08/2016   • PPD Test 10/31/2016, 11/08/2016, 10/16/2017, 10/24/2017, 09/11/2018       Assessment & Plan       * No active hospital problems. *      Yumi Ramirez Day is Day 1  post-partum  Vaginal, Spontaneous   .      Plan:  Continue current care.      Glendy Andrade MD  8/5/2022  05:44 CDT  "

## 2022-08-05 NOTE — PLAN OF CARE
Goal Outcome Evaluation:              Outcome Evaluation: VSS.  Fundus and lochia WDL.  Ambulating and voiding without difficulty.  Pain at level of tolerance with PO pain medicine.  Breastfeeding well and bonding well with baby.  Comfort products at bedside.

## 2022-08-05 NOTE — PLAN OF CARE
Goal Outcome Evaluation:  Plan of Care Reviewed With: patient        Progress: improving  Outcome Evaluation: vss, ff ml u1 scant lochia, ambulating and voiding without difficulty, 2nd degree laceration - comfort products at bedside, breastfeeding independently

## 2022-08-05 NOTE — LACTATION NOTE
Mother's Name: Yumi  Phone #: [phone]  Infant Name: Olegario  : 22  Gestation: 39w4d  Day of life: 1  Birth weight:  7-11.8 (3510g)Discharge weight:  Weight Loss: -0.31%  24 hour Summary of Feeds: 5 BF + 1 Attempt Voids: 1 Stools: 1  Assistive devices (shields, shells, etc): None  Significant Maternal history: , HSV  Maternal Concerns: None at this time   Maternal Goal: Breastfeed  Mother's Medications: PNV, Folvite, Clindamycin, Tylenol  Breastpump for home: Yes  Ped follow up appt: Al[facility]    Patient pumping upon visit. Copious amounts of colostrum noted in bottles. Patient reports infant to be sleepy and not wanting to latch since his circumcision this morning. However, when he is awake and latches, he does well. Discussed collected amounts, syringe feeding, bottle feeding, breast milk storage and adequate feeding amounts. Lactation support offered. Questions/concerns denied.     Instructed mom our lactation team is here for continued support throughout their breastfeeding journey. Our team has encouraged mom to call with any questions or concerns that may arise after discharge.

## 2022-08-05 NOTE — DISCHARGE INSTR - APPOINTMENTS
***Please contact Dr Andrade's office Monday to Schedule your 4 week follow up appointment  [phone]

## 2022-08-05 NOTE — ANESTHESIA POSTPROCEDURE EVALUATION
Patient: Yumi Ramirez Day    Procedure Summary     Date: 08/04/22 Room / Location:     Anesthesia Start: 0326 Anesthesia Stop: 1101    Procedure: LABOR ANALGESIA Diagnosis:     Scheduled Providers:  Provider: Miller Erickson CRNA    Anesthesia Type: epidural ASA Status: 1          Anesthesia Type: epidural    Vitals  Vitals Value Taken Time   /60 08/05/22 0656   Temp 97.8 °F (36.6 °C) 08/05/22 0656   Pulse 78 08/05/22 0656   Resp 16 08/05/22 0656   SpO2 98 % 08/05/22 0656           Post Anesthesia Care and Evaluation    Patient location during evaluation: floor  Patient participation: complete - patient participated  Level of consciousness: awake  Pain management: adequate    Airway patency: patent  Anesthetic complications: No anesthetic complications  PONV Status: none  Cardiovascular status: acceptable  Respiratory status: acceptable  Hydration status: acceptable  Post Neuraxial Block status: Motor and sensory function returned to baseline and No signs or symptoms of PDPH

## 2022-08-06 VITALS
RESPIRATION RATE: 18 BRPM | SYSTOLIC BLOOD PRESSURE: 118 MMHG | BODY MASS INDEX: 28.58 KG/M2 | HEIGHT: 69 IN | WEIGHT: 193 LBS | OXYGEN SATURATION: 97 % | HEART RATE: 78 BPM | DIASTOLIC BLOOD PRESSURE: 63 MMHG | TEMPERATURE: 98.4 F

## 2022-08-06 RX ORDER — IBUPROFEN 800 MG/1
800 TABLET ORAL EVERY 8 HOURS PRN
Qty: 60 TABLET | Refills: 2 | Status: SHIPPED | OUTPATIENT
Start: 2022-08-06

## 2022-08-06 RX ORDER — OXYCODONE HYDROCHLORIDE AND ACETAMINOPHEN 5; 325 MG/1; MG/1
1 TABLET ORAL EVERY 6 HOURS PRN
Qty: 10 TABLET | Refills: 0 | Status: SHIPPED | OUTPATIENT
Start: 2022-08-06 | End: 2022-08-14

## 2022-08-06 RX ADMIN — DOCUSATE SODIUM 100 MG: 100 CAPSULE, LIQUID FILLED ORAL at 08:12

## 2022-08-06 NOTE — PROGRESS NOTES
"[facility]  Vaginal Delivery Progress Note    Subjective   Postpartum Day 2: Vaginal Delivery    The patient feels well.  Her pain is well controlled with prescribed pain medications.   She is ambulating well.  Patient describes her bleeding as thin lochia.    Breastfeeding: pumping    Objective     Vital Signs Range for the last 24 hours  Temperature: Temp:  [98.2 °F (36.8 °C)-98.4 °F (36.9 °C)] 98.4 °F (36.9 °C)   Temp Source: Temp src: Oral   BP: BP: (118-126)/(63-81) 118/63   Pulse: Heart Rate:  [64-78] 78   Respirations: Resp:  [16-18] 18   SPO2: SpO2:  [97 %-98 %] 97 %   O2 Amount (l/min):     O2 Devices Device (Oxygen Therapy): room air   Weight:       Admit Height:  Height: 175.3 cm (69\")      Physical Exam:  General:  no acute distresss.  Abdomen: Fundus: appropriate, firm, non tender  Extremities: normal, atraumatic, no cyanosis, and trace edema.     Lab results reviewed:  Yes   Rubella:  No results found for: RUBELLAIGGIN Nurse Transcribed from prenatal record --  No components found for: EXTRUBELQUAL  Rh Status:    RH type   Date Value Ref Range Status   08/03/2022 Positive  Final     Immunizations:   Immunization History   Administered Date(s) Administered   • Flu Vaccine Split Quad 11/28/2016, 10/13/2017, 10/11/2018   • Hepatitis B 11/08/2016, 12/08/2016   • PPD Test 10/31/2016, 11/08/2016, 10/16/2017, 10/24/2017, 09/11/2018       Assessment & Plan       * No active hospital problems. *      Yumi Ramirez Day is Day 2  post-partum  Vaginal, Spontaneous   .      Plan:  Discharge home with standard precautions and return to clinic in 4-6 weeks.      Glendy Andrade MD  8/6/2022  09:24 CDT  "

## 2022-08-06 NOTE — LACTATION NOTE
"Mother's Name: Yumi  Phone #: [phone]  Infant Name: Olegario  : 22  Gestation: 39w4d  Day of life: 2  Birth weight:  7-11.8 (3510g)Discharge weight: 7-7.3 (3381g)  Weight Loss: -3.67%  24 hour Summary of Feeds: 0 BF- 36.5 EBM + Formula X 3 (43 ml total) Voids: 1 Stools: 1  Assistive devices (shields, shells, etc): None  Significant Maternal history: , HSV  Maternal Concerns: None at this time   Maternal Goal: Pump exclusively   Mother's Medications: PNV, Folvite, Clindamycin, Tylenol  Breastpump for home: Yes  Ped follow up appt: [facility]    Patient reports she has decided to pump exclusively, as she does not \"want to fight\" with infant to latch. She states she started supplementing with formula as well due to persistent hunger cues. Nipples are sore. Damage denied. Breastfeeding After Discharge packet provided. Discussed engorgement and mastitis. Questions/concerns denied.     Instructed mom our lactation team is here for continued support throughout their breastfeeding journey. Our team has encouraged mom to call with any questions or concerns that may arise after discharge.    Breastfeeding After Discharge     Signs of Milk: Fullness, firmness, heaviness of breasts, leaking of milk.  Signs of Good Feed: Breast fullness prior to feed, breasts soft and comfortable after feeding. Infant content after feeding: calm, sleepy, relaxed and without continued hunger cues.  Signs of Plugged Ducts, Engorgement and Mastitis: Plugged ducts (milk entrapment in milk ducts)- small tender knots that often feel like little beans under breast tissue, usually tender. Massage on these areas of concern while breastfeeding or pumping to promote emptying.   Engorgement- fluid or excess milk, breasts become uncomfortably full, tight, firm (compare to the firmness of your cheek (mild), chin (moderate) or forehead (severe). First line of treatment should be to BREASTFEED, if breasts remain full feeling after a feeding, it " may be necessary to pump, ONLY UNTIL BREASTS ARE SOFT AND COMFORTABLE. DO NOT OVER PUMP (complete emptying of breasts can trigger even more milk which will cause continued, recurrent Engorgement).  Mastitis- Infection of the breast tissue, most often caused by plugged ducts that are not adequately treated by emptying, recurrent trauma to nipples or breasts (cracked or bleeding nipples). Signs: redness, swelling, tender knots or fever to breasts as well as generalized fever >101 degrees F that is often sudden onset. Treatment of mastitis, BREASTFEED! Pump after breastfeeding to achieve COMPLETE emptying of affected breast, utilizing massage to areas of concern, may use cold compress to affected area only after breast emptying. May take anti-inflammatories i.e. Ibuprofen, Motrin. CALL your OB for assessment and continued treatment with Antibiotics to adequately treat mastitis.  Infant Care: Over the first 2 weeks it is important to keep record of infant's feeding routine (feeding times and durations), wet and dirty diaper frequency, stool color and any spit ups that may occur.  Keep in mind, ALL babies will lose some weight initially (usually no more than 10% by day 3). Until infant returns to/ surpasses birth weight (which can take up to 2 weeks), it is important to offer feedings AT LEAST EVERY 3 HOURS. Remember, if you choose to supplement infant with formula or previously pumped milk, you should always pump in replacement of that feeding in order to promote and maintain a healthy milk supply!  Maternal Care: REST, sleep when the infant sleeps, stay hydrated (water is optimal) drink to thirst, increase caloric intake - breastfeeding mother's need an ADDITIONAL 500 calories per day , eat 3 meals/day as well as snacks in between, limit CAFFIENE intake to 2 cups/day. Ask your significant other, family members or friends for help when needed, taking advantage of meal trains, allowing others to help with laundry, house  chores, etc can help you focus on what is most important early on after delivery… you and your infant, and breastfeeding!   Medications to CONTINUE: Prenatal Vitamins are important to continue taking while breastfeeding. Fish oil, magnesium/calcium supplements often are helpful to support Mothers and their milk supply as well. Tylenol, Ibuprofen, regular Zyrtec, Claritin are SAFE if you suffer from seasonal allergies. Flonase is safe and often an effective medication to take if suffering from sinus drainage/pressure.  Medications to AVOID: Benadryl, Sudafed, any medications including “DM” or have a drying effect to sinus drainage will also dry a mother's milk up. Birth control- your OB will want to address birth control options with you usually around 4-6 weeks postpartum, be sure to notify your MD if you continue to breastfeed as some birth controls may significantly decrease your milk supply. Herbals- some herbs may also decrease your milk supply: PEPPERMINT, MENTHOL in any form (candies, essential oils, teas, etc), so check labels and avoid using in excess.  Pumping: Although we encourage you to focus on breastfeeding over the first 2-4 weeks, you will need to plan to begin pumping. We do recommend implementing pumping by the time infant is 4 weeks old. Pump 2-3 times per day immediately AFTER breastfeeding, it is normal to collect very small amounts initially, but the more consistently you pump, the more you will begin to collect. Store collected milk in refrigerator or freezer. You should also begin offering infant a bottle around 4 weeks. Remember to use slow flow nipples and PACE the bottle-feed. A bottle feed should take about as long as a breastfeeding session.

## 2022-08-06 NOTE — PLAN OF CARE
Goal Outcome Evaluation:  Plan of Care Reviewed With: patient, spouse           Outcome Evaluation: vss. ff/ml/u1 scant lochia. Using comfort products for lac. Ambulating well. Voiding WNL. No requests for pain meds tonight thus far. Pumping and now supplementing per her request. Bonding with infant.

## 2022-08-06 NOTE — DISCHARGE SUMMARY
Discharge Summary     Liz Ramirez Day  : 1995  MRN: [id number]  [facility]: [id number]    Date of Admission: 8/3/2022   Date of Discharge:  2022   Delivering Physician: Glendy Andrade        Admission Diagnosis: 1. Normal labor [O80, Z37.9]  2. Normal delivery [O80]   Discharge Diagnosis: 1. Pregnancy at 39w6d - delivered       Procedures: 2022  - Vaginal, Spontaneous       Hospital Course  Patient is a 26 y.o.  who at 39w6d had a uncomplicated vaginal delivery.  Her postpartum course was without complications.  On PPD #2 she was ready for discharge.  She had normal lochia and pain was well controlled with oral medications.    Infant  male  fetus weighing 3510 g (7 lb 11.8 oz)   Apgars -  8 @ 1 minute /  9 @ 5 minutes.    Discharge labs  Lab Results   Component Value Date    WBC 13.57 (H) 2022    HGB 9.1 (L) 2022    HCT 27.8 (L) 2022     2022       Discharge Medications     Discharge Medications      New Medications      Instructions Start Date   ibuprofen 800 MG tablet  Commonly known as: ADVIL,MOTRIN   800 mg, Oral, Every 8 Hours PRN      oxyCODONE-acetaminophen 5-325 MG per tablet  Commonly known as: PERCOCET   1 tablet, Oral, Every 6 Hours PRN         Continue These Medications      Instructions Start Date   folic acid 1 MG tablet  Commonly known as: FOLVITE   1 mg, Oral, Daily      prenatal (CLASSIC) vitamin  tablet  Generic drug: prenatal vitamin   1 tablet, Oral, Daily      vitamin B-12 1000 MCG tablet  Commonly known as: CYANOCOBALAMIN   1,000 mcg, Oral, Daily         Stop These Medications    acetaminophen 500 MG tablet  Commonly known as: TYLENOL            Discharge Disposition Home or Self Care   Condition on Discharge: good   Follow-up: 4 weeks with Dr. Andrade, if no appointment, please call office     Glendy Andrade MD  2022

## 2022-08-06 NOTE — PLAN OF CARE
Goal Outcome Evaluation:              Outcome Evaluation: Discharged home in stable condition.  No S/S of distress noted.

## 2022-08-11 LAB
CYTO UR: NORMAL
LAB AP CASE REPORT: NORMAL
Lab: NORMAL
PATH REPORT.FINAL DX SPEC: NORMAL
PATH REPORT.GROSS SPEC: NORMAL

## 2024-01-17 ENCOUNTER — TRANSCRIBE ORDERS (OUTPATIENT)
Dept: ADMINISTRATIVE | Facility: HOSPITAL | Age: 29
End: 2024-01-17
Payer: COMMERCIAL

## 2024-01-17 ENCOUNTER — HOSPITAL ENCOUNTER (OUTPATIENT)
Dept: ULTRASOUND IMAGING | Facility: HOSPITAL | Age: 29
Discharge: HOME | End: 2024-01-17
Admitting: FAMILY MEDICINE
Payer: COMMERCIAL

## 2024-01-17 DIAGNOSIS — R10.11 RIGHT UPPER QUADRANT PAIN: Primary | ICD-10-CM

## 2024-01-17 DIAGNOSIS — R10.11 RIGHT UPPER QUADRANT PAIN: ICD-10-CM

## 2024-01-17 PROCEDURE — 76705 ECHO EXAM OF ABDOMEN: CPT

## 2024-01-18 ENCOUNTER — TRANSCRIBE ORDERS (OUTPATIENT)
Dept: ADMINISTRATIVE | Facility: HOSPITAL | Age: 29
End: 2024-01-18
Payer: COMMERCIAL

## 2024-01-18 DIAGNOSIS — R10.11 RIGHT UPPER QUADRANT PAIN: Primary | ICD-10-CM

## 2024-06-19 ENCOUNTER — TELEPHONE (OUTPATIENT)
Dept: OBSTETRICS AND GYNECOLOGY | Age: 29
End: 2024-06-19

## 2024-06-19 NOTE — TELEPHONE ENCOUNTER
Caller: Yumi Orozco    Relationship to patient: Self    Best call back number: [phone] (home)       Patient is needing: PT CALLED IN STATING THAT SHE BELIEVES SHE MAY HAVE A CONGESTION INFECTION AND WOULD LIKE TO HAVE A LIST OF APPROVED MEDICATIONS SHE CAN TAKE FOR ANYTHING DURING PREGNANCY. CURRENTLY HAS NEW 7/16/24.

## 2024-06-19 NOTE — TELEPHONE ENCOUNTER
I spoke with patient in regards to safe medications to take before 12 weeks of pregnancy. That list was provided to her. Pt v/u. Pt also states that she has been very nauseous. Pt has been using preggy pops and usama. Pt states that she has been taking a zofran PRN. Pt has new ob appt 7/16/2024

## 2024-07-16 ENCOUNTER — INITIAL PRENATAL (OUTPATIENT)
Age: 29
End: 2024-07-16
Payer: COMMERCIAL

## 2024-07-16 VITALS — DIASTOLIC BLOOD PRESSURE: 74 MMHG | BODY MASS INDEX: 23.63 KG/M2 | SYSTOLIC BLOOD PRESSURE: 128 MMHG | WEIGHT: 160 LBS

## 2024-07-16 DIAGNOSIS — Z34.81 MULTIGRAVIDA IN FIRST TRIMESTER: ICD-10-CM

## 2024-07-16 DIAGNOSIS — O21.9 NAUSEA AND VOMITING DURING PREGNANCY: ICD-10-CM

## 2024-07-16 DIAGNOSIS — Z36.3 SCREENING, ANTENATAL, FOR MALFORMATION BY ULTRASOUND: ICD-10-CM

## 2024-07-16 DIAGNOSIS — Z3A.09 9 WEEKS GESTATION OF PREGNANCY: ICD-10-CM

## 2024-07-16 DIAGNOSIS — O36.80X0 ENCOUNTER TO DETERMINE FETAL VIABILITY OF PREGNANCY, SINGLE OR UNSPECIFIED FETUS: Primary | ICD-10-CM

## 2024-07-16 RX ORDER — ONDANSETRON 8 MG/1
TABLET, ORALLY DISINTEGRATING ORAL
Status: CANCELLED | OUTPATIENT
Start: 2024-07-16

## 2024-07-16 RX ORDER — ONDANSETRON 4 MG/1
4 TABLET, ORALLY DISINTEGRATING ORAL EVERY 8 HOURS PRN
Qty: 30 TABLET | Refills: 1 | Status: SHIPPED | OUTPATIENT
Start: 2024-07-16

## 2024-07-16 RX ORDER — ONDANSETRON 8 MG/1
TABLET, ORALLY DISINTEGRATING ORAL
COMMUNITY
End: 2024-07-16

## 2024-07-16 NOTE — PROGRESS NOTES
28-year old patient arrived to initiate prenatal care.     HPI: . Patient's last menstrual period was 05/15/2024.  This is a desired pregnancy. Her main concern is nausea in pregnancy. She has been using Zofran left from her previous pregnancy and is requesting a refill. Pre-pregnancy weight of 160 pounds. She is accompanied by her .    Previous prenatal history significant for: TSVD x 1 with a second degree laceration  History significant for: Allergy - cephalosporins; HSV    The following portion of the patient's history were reviewed and updated as needed: allergies, current medications, past family history, past medical history, social history, surgical history, and problem list.    ROS: All systems reviewed and are negative with exception of the following: amenorrhea, fatigue, nausea, bloating, constipation      US ordered today, reviewed and shows IUP of 9w1d gestation and Estimated Date of Delivery: 25. Normal-appearing ovaries.       Pap Smear 10/7/2023: NILM (ECTZ present)    Exam:  Wt: 160 lb for TWG of 0 kg (0 lb), B/P 128/74, FHTs 193   General Appearance:  healthy-appearing . Appropriate mood and behavior.  HEENT:  Neck supple, no thyroidmegaly.  Cardiorespiratory: HR str and reg. No murmur. Lungs clear. Resp even and unlabored.  Abd: Soft, nontender. No CVA tenderness.   Ext: Calves non-tender. No cyanosis or edema.    Diagnoses and all orders for this visit:    1. Encounter to determine fetal viability of pregnancy, single or unspecified fetus (Primary)  Ultrasound today and reviewed: Viable hernandez intrauterine gestation with 24.6 mm crown-rump length for an estimated gestational age of 9 weeks 1 day is 2 days earlier than the estimated gestational age by reported LMP.     2. 9 weeks gestation of pregnancy  Initial prenatal labs and referrals discussed. Orders entered.  -     ToxASSURE Select 13 (MW) - Urine, Clean Catch  -     ABO / Rh  -     CBC & Differential  -      Antibody Screen  -     Hepatitis B Surface Antigen  -     RPR, Rfx Qn RPR / Confirm TP  -     Rubella Antibody, IgG  -     Urine Culture - , Urine, Clean Catch  -     HIV-1 / O / 2 Ag / Antibody  -     Varicella Zoster Antibody, IgG  -     Chlamydia trachomatis, Neisseria gonorrhoeae, Trichomonas vaginalis, PCR - Urine, Urine, Random Void  -     HCV Antibody Rfx To Qnt PCR  -     TSH Rfx On Abnormal To Free T4  -     Hemoglobin A1c  -     Ambulatory Referral to MFM/Perinatology  -     ondansetron ODT (ZOFRAN-ODT) 4 MG disintegrating tablet; Place 1 tablet on the tongue Every 8 (Eight) Hours As Needed for Nausea or Vomiting.  Dispense: 30 tablet; Refill: 1    3. Multigravida in first trimester  Reviewed information in new OB packet, including OTC medications for use during pregnancy, first trimester of pregnancy and discomforts, regular OB routine, ffDNA/chromosomal risk and maternal carrier screening testing.  Advised to maintain regular activity and/or exercise. Discussed bleeding and pelvic pain warnings and other signs to report. Discussed and ordered initial prenatal labs today. First Trimester of Pregnancy video included in AVS.    4. Nausea and vomiting during pregnancy  Discussed nausea relief measures both nonpharmacologic (small frequent meals or snacks, avoiding triggers, aromatherapy, ginger, hard and/or sour candies or Preggie Pops, ginger, accu-pressure wrist bands, citrus/citric acid) and pharmacologic (both OTC and Rx). Encouraged adequate hydration and intake of calories. Patient to notify provider if symptoms persist or worsen Morning Sickness and Doxylamine; Vitamin B6 Delayed- and Extended-Release Tablets education included in the AVS. Encouraged to use the Zofran sparingly. Prescription sent to pharmacy for refill.   -     ondansetron ODT (ZOFRAN-ODT) 4 MG disintegrating tablet; Place 1 tablet on the tongue Every 8 (Eight) Hours As Needed for Nausea or Vomiting.  Dispense: 30 tablet; Refill:  1    5. Screening, , for malformation by ultrasound  Discussed anatomy scan to be completed through perinatology at approximately 20-weeks gestation. Anatomy scan completed through collaboration with their practice to assess for possible anomalies or markers for aneuploidy.   -     Ambulatory Referral to M/Perinatology        Return to the office in 4 weeks for routine prenatal visit with Dr. Cuevas and as needed with concerns.        This note has been signed electronically.     Elza Polanco, JOANIE, APRN, CNM, RNC-OB

## 2024-07-18 LAB
ABO GROUP BLD: NORMAL
BACTERIA UR CULT: NORMAL
BACTERIA UR CULT: NORMAL
BASOPHILS # BLD AUTO: 0 X10E3/UL (ref 0–0.2)
BASOPHILS NFR BLD AUTO: 0 %
BLD GP AB SCN SERPL QL: NEGATIVE
C TRACH RRNA SPEC QL NAA+PROBE: NEGATIVE
EOSINOPHIL # BLD AUTO: 0.2 X10E3/UL (ref 0–0.4)
EOSINOPHIL NFR BLD AUTO: 1 %
ERYTHROCYTE [DISTWIDTH] IN BLOOD BY AUTOMATED COUNT: 13 % (ref 11.7–15.4)
HBA1C MFR BLD: 4.9 % (ref 4.8–5.6)
HBV SURFACE AG SERPL QL IA: NEGATIVE
HCT VFR BLD AUTO: 38.1 % (ref 34–46.6)
HCV AB SERPL QL IA: NORMAL
HCV IGG SERPL QL IA: NON REACTIVE
HGB BLD-MCNC: 12.7 G/DL (ref 11.1–15.9)
HIV 1+2 AB+HIV1 P24 AG SERPL QL IA: NON REACTIVE
IMM GRANULOCYTES # BLD AUTO: 0 X10E3/UL (ref 0–0.1)
IMM GRANULOCYTES NFR BLD AUTO: 0 %
LYMPHOCYTES # BLD AUTO: 0.8 X10E3/UL (ref 0.7–3.1)
LYMPHOCYTES NFR BLD AUTO: 8 %
MCH RBC QN AUTO: 31.4 PG (ref 26.6–33)
MCHC RBC AUTO-ENTMCNC: 33.3 G/DL (ref 31.5–35.7)
MCV RBC AUTO: 94 FL (ref 79–97)
MONOCYTES # BLD AUTO: 0.7 X10E3/UL (ref 0.1–0.9)
MONOCYTES NFR BLD AUTO: 6 %
N GONORRHOEA RRNA SPEC QL NAA+PROBE: NEGATIVE
NEUTROPHILS # BLD AUTO: 9.4 X10E3/UL (ref 1.4–7)
NEUTROPHILS NFR BLD AUTO: 85 %
PLATELET # BLD AUTO: 225 X10E3/UL (ref 150–450)
RBC # BLD AUTO: 4.05 X10E6/UL (ref 3.77–5.28)
RH BLD: POSITIVE
RPR SER QL: NON REACTIVE
RUBV IGG SERPL IA-ACNC: 3.14 INDEX
T VAGINALIS RRNA SPEC QL NAA+PROBE: NEGATIVE
TSH SERPL DL<=0.005 MIU/L-ACNC: 1.02 UIU/ML (ref 0.45–4.5)
VZV IGG SER IA-ACNC: 691 INDEX
WBC # BLD AUTO: 11.2 X10E3/UL (ref 3.4–10.8)

## 2024-07-20 PROBLEM — Z34.90 PREGNANCY: Status: ACTIVE | Noted: 2024-07-16

## 2024-07-20 PROBLEM — Z64.1 MULTIGRAVIDA: Status: ACTIVE | Noted: 2024-07-16

## 2024-07-23 LAB — DRUGS UR: NORMAL

## 2024-08-13 ENCOUNTER — ROUTINE PRENATAL (OUTPATIENT)
Age: 29
End: 2024-08-13
Payer: COMMERCIAL

## 2024-08-13 VITALS — SYSTOLIC BLOOD PRESSURE: 120 MMHG | BODY MASS INDEX: 23.97 KG/M2 | WEIGHT: 162.3 LBS | DIASTOLIC BLOOD PRESSURE: 68 MMHG

## 2024-08-13 DIAGNOSIS — R00.2 PALPITATIONS: ICD-10-CM

## 2024-08-13 DIAGNOSIS — Z3A.13 13 WEEKS GESTATION OF PREGNANCY: Primary | ICD-10-CM

## 2024-08-13 LAB
GLUCOSE UR STRIP-MCNC: NEGATIVE MG/DL
PROT UR STRIP-MCNC: ABNORMAL MG/DL

## 2024-08-13 PROCEDURE — 0502F SUBSEQUENT PRENATAL CARE: CPT | Performed by: OBSTETRICS & GYNECOLOGY

## 2024-08-13 NOTE — PROGRESS NOTES
Having bouts of palpitations and heart pounding. No FM yet. No VB, LOF.  Desires NIPS but no carrier screening.    /68   Wt 73.6 kg (162 lb 4.8 oz)   LMP 05/15/2024   BMI 23.97 kg/m²    FHTs 166  Urine protein trace, urine glucose negative    Diagnoses and all orders for this visit:    1. 13 weeks gestation of pregnancy (Primary)  -     POC Urinalysis Dipstick  -     [facility] Panorama Prenatal Test: Chromosomes 13, 18, 21, X & Y: Triploidy 22Q.11.2 Deletion - Blood,  IUP at 13 weeks gestation, doing well. RTC 4 weeks with sneak peak.   2. Palpitations  -     TSH Rfx On Abnormal To Free T4  -     CBC (No Diff)

## 2024-08-14 LAB
ERYTHROCYTE [DISTWIDTH] IN BLOOD BY AUTOMATED COUNT: 12.9 % (ref 11.7–15.4)
HCT VFR BLD AUTO: 35.6 % (ref 34–46.6)
HGB BLD-MCNC: 12.1 G/DL (ref 11.1–15.9)
MCH RBC QN AUTO: 32.3 PG (ref 26.6–33)
MCHC RBC AUTO-ENTMCNC: 34 G/DL (ref 31.5–35.7)
MCV RBC AUTO: 95 FL (ref 79–97)
PLATELET # BLD AUTO: 250 X10E3/UL (ref 150–450)
RBC # BLD AUTO: 3.75 X10E6/UL (ref 3.77–5.28)
TSH SERPL DL<=0.005 MIU/L-ACNC: 1.11 UIU/ML (ref 0.45–4.5)
WBC # BLD AUTO: 10.1 X10E3/UL (ref 3.4–10.8)

## 2024-08-22 LAB
Lab: NORMAL
NTRA FETAL FRACTION: NORMAL
NTRA GENDER OF FETUS: NORMAL
NTRA MONOSOMY X AGE-BASED RISK TEXT: NORMAL
NTRA MONOSOMY X RESULT TEXT: NORMAL
NTRA MONOSOMY X RISK SCORE TEXT: NORMAL
NTRA TRIPLOIDY RESULT TEXT: NORMAL
NTRA TRISOMY 13 AGE-BASED RISK TEXT: NORMAL
NTRA TRISOMY 13 RESULT TEXT: NORMAL
NTRA TRISOMY 13 RISK SCORE TEXT: NORMAL
NTRA TRISOMY 18 AGE-BASED RISK TEXT: NORMAL
NTRA TRISOMY 18 RESULT TEXT: NORMAL
NTRA TRISOMY 18 RISK SCORE TEXT: NORMAL
NTRA TRISOMY 21 AGE-BASED RISK TEXT: NORMAL
NTRA TRISOMY 21 RESULT TEXT: NORMAL
NTRA TRISOMY 21 RISK SCORE TEXT: NORMAL

## 2024-09-10 ENCOUNTER — ROUTINE PRENATAL (OUTPATIENT)
Age: 29
End: 2024-09-10
Payer: COMMERCIAL

## 2024-09-10 VITALS — SYSTOLIC BLOOD PRESSURE: 104 MMHG | WEIGHT: 165.6 LBS | DIASTOLIC BLOOD PRESSURE: 70 MMHG | BODY MASS INDEX: 24.45 KG/M2

## 2024-09-10 DIAGNOSIS — Z3A.17 17 WEEKS GESTATION OF PREGNANCY: Primary | ICD-10-CM

## 2024-09-10 LAB
GLUCOSE UR STRIP-MCNC: NEGATIVE MG/DL
PROT UR STRIP-MCNC: NEGATIVE MG/DL

## 2024-09-10 PROCEDURE — 0502F SUBSEQUENT PRENATAL CARE: CPT | Performed by: OBSTETRICS & GYNECOLOGY

## 2024-09-10 NOTE — PROGRESS NOTES
NO complaints. Sneak peak today. Has anatomy US scheduled. Denies VB, LOF, pain. GFM.     /70   Wt 75.1 kg (165 lb 9.6 oz)   LMP 05/15/2024   BMI 24.45 kg/m²    FHTs 151  Urine protein and glucose negative    Diagnoses and all orders for this visit:    1. 17 weeks gestation of pregnancy (Primary)  -     POC Urinalysis Dipstick  IUP at 17 weeks gestation, doing well. RTC 4 weeks.

## 2024-10-08 ENCOUNTER — ROUTINE PRENATAL (OUTPATIENT)
Age: 29
End: 2024-10-08
Payer: COMMERCIAL

## 2024-10-08 VITALS — SYSTOLIC BLOOD PRESSURE: 118 MMHG | WEIGHT: 169 LBS | DIASTOLIC BLOOD PRESSURE: 70 MMHG | BODY MASS INDEX: 24.96 KG/M2

## 2024-10-08 DIAGNOSIS — Z34.82 MULTIGRAVIDA IN SECOND TRIMESTER: ICD-10-CM

## 2024-10-08 DIAGNOSIS — Z3A.21 21 WEEKS GESTATION OF PREGNANCY: Primary | ICD-10-CM

## 2024-10-08 DIAGNOSIS — Z71.85 IMMUNIZATION COUNSELING: ICD-10-CM

## 2024-10-08 LAB
GLUCOSE UR STRIP-MCNC: NEGATIVE MG/DL
PROT UR STRIP-MCNC: NEGATIVE MG/DL

## 2024-10-08 NOTE — PROGRESS NOTES
Reason for visit: Routine OB visit at 21w1d. RASHEEDA 2025, by Ultrasound    CC: Some sciatic pain, nothing unusual for her. Denies VB, LOF, pelvic pain, or cramping.     ROS: All systems reviewed and are negative with exception of the following amenorrhea, sciatic pain.    Wt 169 lb for a TWG of 4.082 kg (9 lb), /70, FHTs 159, FH 20 cm    Urine today and reviewed: negative glucose, negative protein    20-week Anatomy Scan: EFW 7%, normal anatomy, posterior placenta    Exam:  General Appearance:  Healthy appearing . Normal mood and behavior.  HEENT: NCAT, EOMI  HR str and reg. Lungs clear. Resp even and unlabored.  Abdomen is soft and nontender. No CVA tenderness. Uterus is consistent with EGA  Ext: No edema, nontender, no trauma, or cyanosis.    Impression  Diagnoses and all orders for this visit:    1. 21 weeks gestation of pregnancy (Primary)  -     POC Urinalysis Dipstick    2. Multigravida in second trimester  Discussed second trimester of pregnancy, discomforts and measures of support, fetal movement, bleeding warnings, pelvic pain and cramping/contraction warnings, and signs and symptoms to report. Reviewed anatomy scan report. Discussed and encouraged to call or come to the hospital with vaginal bleeding, leaking of fluid, contractions, or any other concerns.     3. Immunization counseling  Discussed influenza immunization recommendations during pregnancy. She declines secondary to previous flu vaccine making her feel ill.         Refer to the AVS instructions for additional education provided with this visit.         Return to the office in 4 weeks for a routine prenatal visit with Dr. Cuevas and as needed with concerns.        This note has been signed electronically.    Elza Polanco, DNP, APRN, CNM, RNC-OB

## 2024-10-28 ENCOUNTER — TELEPHONE (OUTPATIENT)
Dept: OBSTETRICS AND GYNECOLOGY | Age: 29
End: 2024-10-28
Payer: COMMERCIAL

## 2024-10-28 NOTE — TELEPHONE ENCOUNTER
Patient calls today to request safe medications for sleep. She has tried benadryl and Melatonin. Advised patient to try Unisom OTC. She VU

## 2024-11-05 ENCOUNTER — ROUTINE PRENATAL (OUTPATIENT)
Age: 29
End: 2024-11-05
Payer: COMMERCIAL

## 2024-11-05 VITALS — BODY MASS INDEX: 25.4 KG/M2 | DIASTOLIC BLOOD PRESSURE: 76 MMHG | SYSTOLIC BLOOD PRESSURE: 112 MMHG | WEIGHT: 172 LBS

## 2024-11-05 DIAGNOSIS — Z34.82 MULTIGRAVIDA IN SECOND TRIMESTER: ICD-10-CM

## 2024-11-05 DIAGNOSIS — Z71.85 IMMUNIZATION COUNSELING: ICD-10-CM

## 2024-11-05 DIAGNOSIS — Z3A.25 25 WEEKS GESTATION OF PREGNANCY: Primary | ICD-10-CM

## 2024-11-05 LAB
GLUCOSE UR STRIP-MCNC: NEGATIVE MG/DL
PROT UR STRIP-MCNC: NEGATIVE MG/DL

## 2024-11-05 NOTE — PROGRESS NOTES
Reason for visit: Routine OB visit at 25w1d     CC:  Denies concerns. Endorses good fetal movement.     ROS: All systems reviewed and are negative with exception of the following: amenorrhea    Weight 78 kg (172 lb); /76; ; FH 25 cm   Total weight gain: 5.443 kg (12 lb) with Total weight gain expected 11.5 kg (25 lb)-16 kg (35 lb)    Urine today and reviewed: negative glucose, negative protein    Ultrasound  20-week anatomy scan: EFW 72%, normal, posterior placenta no previa    Exam:  General Appearance:  No visualized signs of distress. Normal mood and behavior  Cardiorespiratory: HR str and reg. Lungs clear. Resp even and unlabored.  Abdomen: is soft and nontender. No CVA tenderness. Gravid uterus.  Ext: no edema. Calves non-tender.     Impression  Diagnoses and all orders for this visit:    1. 25 weeks gestation of pregnancy (Primary)  -     POC Urinalysis Dipstick    2. Multigravida in second trimester  Discussed second trimester of pregnancy, discomforts and measures of support, fetal movement,  labor warnings, preeclampsia warnings, and signs and symptoms to report. Also discussed with patient plan for hemoglobin and glucose tolerance testing. Instructions on glucose tolerance test and dietary intake prior to the test provided. Patient to notify provider or come to the hospital with vaginal bleeding, leaking of fluid, contractions, or other concerns.     3. Immunization counseling  Discussed influenza and Tdap immunization recommendations during pregnancy, influenza screening, and self-care measures. She declines the influenza immunization during today's visit. Will consider the Tdap immunization for her next visit.           Refer to the AVS instructions for additional education provided with today's visit.         Return to the office in 3 weeks for routine prenatal visit with Dr. Cuevas and as needed with concerns.        This note has been signed electronically.    Elza Polanco DNP,  APRN, CNM, RNC-OB

## 2024-11-27 ENCOUNTER — ROUTINE PRENATAL (OUTPATIENT)
Age: 29
End: 2024-11-27
Payer: COMMERCIAL

## 2024-11-27 VITALS — WEIGHT: 179 LBS | SYSTOLIC BLOOD PRESSURE: 102 MMHG | BODY MASS INDEX: 26.43 KG/M2 | DIASTOLIC BLOOD PRESSURE: 70 MMHG

## 2024-11-27 DIAGNOSIS — Z11.3 SCREENING EXAMINATION FOR STI: ICD-10-CM

## 2024-11-27 DIAGNOSIS — Z71.85 IMMUNIZATION COUNSELING: ICD-10-CM

## 2024-11-27 DIAGNOSIS — Z13.0 SCREENING FOR DEFICIENCY ANEMIA: ICD-10-CM

## 2024-11-27 DIAGNOSIS — Z3A.28 28 WEEKS GESTATION OF PREGNANCY: Primary | ICD-10-CM

## 2024-11-27 DIAGNOSIS — Z13.1 SPECIAL SCREENING EXAMINATION FOR DIABETES MELLITUS: ICD-10-CM

## 2024-11-27 DIAGNOSIS — Z34.83 MULTIGRAVIDA IN THIRD TRIMESTER: ICD-10-CM

## 2024-11-27 LAB
GLUCOSE UR STRIP-MCNC: NEGATIVE MG/DL
PROT UR STRIP-MCNC: NEGATIVE MG/DL

## 2024-11-27 NOTE — PROGRESS NOTES
"Reason for visit: Routine OB visit at 28w2d     CC:  She has had some \"side stitches\" over the last couple of days. Endorses good fetal movement.     ROS: All systems reviewed and are negative with exception of the following: Amenorrhea, side pain    Weight 81.2 kg (179 lb); /70; ; FH 28 cm  Total weight gain: 8.618 kg (19 lb) with Total weight gain expected 11.5 kg (25 lb)-16 kg (35 lb)    Urine today and reviewed: negative glucose, negative protein    Ultrasound  20-week: posterior; EFW 72%    Exam:  General Appearance:  No visualized signs of distress. Normal mood and behavior  Cardiorespiratory: HR str and reg. Lungs clear. Resp even and unlabored.  Abdomen: is soft and nontender. No CVA tenderness. Gravid uterus.  Ext: No edema. Calves non-tender.     Impression  Diagnoses and all orders for this visit:    1. 28 weeks gestation of pregnancy (Primary)  Discussed and encouraged practicing measures of relaxation during the birthing experience.   -     Gestational Screen 1 Hr ([facility])  -     RPR Qualitative with Reflex to Quant  -     POC Urinalysis Dipstick  -     CBC (No Diff)    2. Multigravida in third trimester  Discussed third trimester of pregnancy, including discomforts and measures of support,  labor warnings, preeclampsia warnings, and signs and symptoms to report. Discussed glucose and hemoglobin screenings today in the clinic. Labs ordered today. Patient to notify provider and/or come to the hospital for vaginal bleeding, leaking of fluid, contractions, or other concerns.     3. Special screening examination for diabetes mellitus  -     Gestational Screen 1 Hr ([facility])    4. Screening for deficiency anemia  -     CBC (No Diff)    5. Screening examination for STI  -     RPR Qualitative with Reflex to Quant    6. Immunization counseling  Discussed influenza and Tdap immunization recommendations during pregnancy.  She plans for Tdap immunization with next prenatal visit.  Declines " influenza immunization.  To practice self-care measures for immune health and agrees to be screened if experiences symptoms of the flu.          Refer to the AVS instructions for additional education provided with today's visit.         Return to the office in 2 weeks for routine prenatal visit with Dr. Cuevas and as needed with concerns.        This note has been signed electronically.    Elza Polanco, DNP, APRN, CNM, RNC-OB

## 2024-11-28 LAB
ERYTHROCYTE [DISTWIDTH] IN BLOOD BY AUTOMATED COUNT: 12.5 % (ref 11.7–15.4)
GLUCOSE 1H P 50 G GLC PO SERPL-MCNC: 113 MG/DL (ref 70–139)
HCT VFR BLD AUTO: 31 % (ref 34–46.6)
HGB BLD-MCNC: 10.5 G/DL (ref 11.1–15.9)
MCH RBC QN AUTO: 32.8 PG (ref 26.6–33)
MCHC RBC AUTO-ENTMCNC: 33.9 G/DL (ref 31.5–35.7)
MCV RBC AUTO: 97 FL (ref 79–97)
PLATELET # BLD AUTO: 211 X10E3/UL (ref 150–450)
RBC # BLD AUTO: 3.2 X10E6/UL (ref 3.77–5.28)
RPR SER QL: NON REACTIVE
WBC # BLD AUTO: 12.6 X10E3/UL (ref 3.4–10.8)

## 2024-12-10 ENCOUNTER — ROUTINE PRENATAL (OUTPATIENT)
Age: 29
End: 2024-12-10
Payer: COMMERCIAL

## 2024-12-10 VITALS — WEIGHT: 179.2 LBS | SYSTOLIC BLOOD PRESSURE: 110 MMHG | BODY MASS INDEX: 26.46 KG/M2 | DIASTOLIC BLOOD PRESSURE: 72 MMHG

## 2024-12-10 DIAGNOSIS — Z3A.30 30 WEEKS GESTATION OF PREGNANCY: Primary | ICD-10-CM

## 2024-12-10 RX ORDER — FERROUS SULFATE 324(65)MG
324 TABLET, DELAYED RELEASE (ENTERIC COATED) ORAL
COMMUNITY

## 2024-12-10 NOTE — PROGRESS NOTES
GINI visit. Having cramping but denies ctx. GFM. Denies VB, LOF.  Had the stomach bug last week. Encouraged hydration.     /72   Wt 81.3 kg (179 lb 3.2 oz)   LMP 05/15/2024   BMI 26.46 kg/m²    FHTs 154  Urine protein trace, urine glucose negative    Diagnoses and all orders for this visit:    1. 30 weeks gestation of pregnancy (Primary)  IUP at 30+ weeks gestation, doing well. RTC 2 weeks with 4D US.  RSV vaccine discussed and info given.

## 2024-12-23 ENCOUNTER — ROUTINE PRENATAL (OUTPATIENT)
Age: 29
End: 2024-12-23
Payer: COMMERCIAL

## 2024-12-23 VITALS — SYSTOLIC BLOOD PRESSURE: 104 MMHG | BODY MASS INDEX: 27.02 KG/M2 | WEIGHT: 183 LBS | DIASTOLIC BLOOD PRESSURE: 82 MMHG

## 2024-12-23 DIAGNOSIS — Z3A.32 32 WEEKS GESTATION OF PREGNANCY: Primary | ICD-10-CM

## 2024-12-23 LAB
GLUCOSE UR STRIP-MCNC: NEGATIVE MG/DL
PROT UR STRIP-MCNC: NEGATIVE MG/DL

## 2024-12-23 NOTE — PROGRESS NOTES
No complaints. GFM. Would like to wait for next visit for RSV.  Denies VB, LOF, ctx.     /82   Wt 83 kg (183 lb)   LMP 05/15/2024   BMI 27.02 kg/m²    FHTs 136  Urine protein and glucose negative    Diagnoses and all orders for this visit:    1. 32 weeks gestation of pregnancy (Primary)  -     POC Urinalysis Dipstick  IUP at 32 weeks gestation, doing well. RTC 2 weeks. 4D US today. RSV vaccine next visit.

## 2025-01-06 ENCOUNTER — ROUTINE PRENATAL (OUTPATIENT)
Age: 30
End: 2025-01-06
Payer: COMMERCIAL

## 2025-01-06 VITALS — DIASTOLIC BLOOD PRESSURE: 78 MMHG | BODY MASS INDEX: 27.32 KG/M2 | SYSTOLIC BLOOD PRESSURE: 114 MMHG | WEIGHT: 185 LBS

## 2025-01-06 DIAGNOSIS — Z3A.34 34 WEEKS GESTATION OF PREGNANCY: Primary | ICD-10-CM

## 2025-01-06 NOTE — PROGRESS NOTES
No complaints. GFM. Denies VB, LOF, ctx. Would like tdap today.     /78   Wt 83.9 kg (185 lb)   LMP 05/15/2024   BMI 27.32 kg/m²    FHTs 142  Urine and glucose negative    Diagnoses and all orders for this visit:    1. 34 weeks gestation of pregnancy (Primary)  -     Tdap Vaccine Greater Than or Equal To 6yo IM  IUP at 34 weeks gestation, doing well. RTC 2 weeks with GBS

## 2025-01-13 ENCOUNTER — TELEPHONE (OUTPATIENT)
Dept: OBSTETRICS AND GYNECOLOGY | Age: 30
End: 2025-01-13
Payer: COMMERCIAL

## 2025-01-13 ENCOUNTER — HOSPITAL ENCOUNTER (OUTPATIENT)
Facility: HOSPITAL | Age: 30
Discharge: HOME | End: 2025-01-13
Attending: OBSTETRICS & GYNECOLOGY | Admitting: OBSTETRICS & GYNECOLOGY
Payer: COMMERCIAL

## 2025-01-13 VITALS
TEMPERATURE: 97.8 F | DIASTOLIC BLOOD PRESSURE: 68 MMHG | BODY MASS INDEX: 26.9 KG/M2 | HEIGHT: 69 IN | SYSTOLIC BLOOD PRESSURE: 113 MMHG | WEIGHT: 181.6 LBS | HEART RATE: 82 BPM | RESPIRATION RATE: 16 BRPM

## 2025-01-13 LAB
BILIRUB UR QL STRIP: NEGATIVE
CLARITY UR: CLEAR
COLOR UR: ABNORMAL
GLUCOSE UR STRIP-MCNC: NEGATIVE MG/DL
HGB UR QL STRIP.AUTO: NEGATIVE
KETONES UR QL STRIP: NEGATIVE
LEUKOCYTE ESTERASE UR QL STRIP.AUTO: NEGATIVE
NITRITE UR QL STRIP: NEGATIVE
PH UR STRIP.AUTO: 6.5 [PH] (ref 5–8)
PROT UR QL STRIP: ABNORMAL
SP GR UR STRIP: 1.02 (ref 1–1.03)
UROBILINOGEN UR QL STRIP: ABNORMAL

## 2025-01-13 PROCEDURE — G0378 HOSPITAL OBSERVATION PER HR: HCPCS

## 2025-01-13 PROCEDURE — 81003 URINALYSIS AUTO W/O SCOPE: CPT | Performed by: OBSTETRICS & GYNECOLOGY

## 2025-01-13 PROCEDURE — 87086 URINE CULTURE/COLONY COUNT: CPT | Performed by: OBSTETRICS & GYNECOLOGY

## 2025-01-13 PROCEDURE — G0463 HOSPITAL OUTPT CLINIC VISIT: HCPCS

## 2025-01-13 NOTE — PROGRESS NOTES
Liz Ramirez Day  : 1995  MRN: [id number]  CSN: [id number]    Labor & Delivery JUSTIN progress note    Subjective   Chief Complaint: She reports irregular contractions, which seems to have eased up this morning. No other complaints. Cervix closed    HPI:     History:    Prenatal Information:  Prenatal Results       Initial Prenatal Labs       Test Value Reference Range Date Time    Hemoglobin  12.1 g/dL 11.1 - 15.9 24 1354       12.7 g/dL 11.1 - 15.9 24 1521    Hematocrit  35.6 % 34.0 - 46.6 24 1354       38.1 % 34.0 - 46.6 24 1521    Platelets  250 x10E3/uL 150 - 450 24 1354       225 x10E3/uL 150 - 450 24 1521    Rubella IgG  3.14 index Immune >0.99 24 1521    Hepatitis B SAg  Negative  Negative 24 1521    Hepatitis C Ab        RPR  Non Reactive  Non Reactive 24 1354       Non Reactive  Non Reactive 24 1521    T. Pallidum Ab         ABO  O   24 1521    Rh  Positive   24 1521    Antibody Screen  Negative  Negative 24 1521    HIV  Non Reactive  Non Reactive 24 1521    Urine Culture  Final report   24 1521    Gonorrhea  Negative  Negative 24 1521    Chlamydia  Negative  Negative 24 1521    TSH  1.110 uIU/mL 0.450 - 4.500 24 1354       1.020 uIU/mL 0.450 - 4.500 24 1521    HgB A1c   4.9 % 4.8 - 5.6 24 1521    Varicella IgG  691 index Immune >165 24 1521    Hemoglobinopathy Fractionation        Hemoglobinopathy (genetic testing)        Cystic fibrosis         Spinal muscular atrophy        Fragile X                  Fetal testing        Test Value Reference Range Date Time    NIPT        MSAFP        AFP-4                  2nd and 3rd Trimester       Test Value Reference Range Date Time    Hemoglobin (repeated)  10.5 g/dL 11.1 - 15.9 24 1354    Hematocrit (repeated)  31.0 % 34.0 - 46.6 24 1354    Platelets   211 x10E3/uL 150 - 450 24 1354       250 x10E3/uL  150 - 450 08/13/24 1354       225 x10E3/uL 150 - 450 07/16/24 1521    1 hour GTT   113 mg/dL 70 - 139 11/27/24 1354    Antibody Screen (repeated)        3rd TM syphilis scrn (repeated)  RPR   Non Reactive  Non Reactive 11/27/24 1354    3rd TM syphilis scrn (repeated) TP-Ab        3rd TM syphilis screen TB-Ab (FTA)        Syphilis cascade test TP-Ab (EIA)        Syphilis cascade TPPA        GTT Fasting        GTT 1 Hr        GTT 2 Hr        GTT 3 Hr        Group B Strep                  Other testing        Test Value Reference Range Date Time    Parvo IgG         CMV IgG                   Drug Screening       Test Value Reference Range Date Time    Amphetamine Screen        Barbiturate Screen        Benzodiazepine Screen        Methadone Screen        Phencyclidine Screen        Opiates Screen        THC Screen        Cocaine Screen        Propoxyphene Screen        Buprenorphine Screen        Methamphetamine Screen        Oxycodone Screen        Tricyclic Antidepressants Screen                  Legend    ^: Historical                          External Prenatal Results       Pregnancy Outside Results - Transcribed From Office Records - See Scanned Records For Details       Test Value Date Time    ABO  O  07/16/24 1521    Rh  Positive  07/16/24 1521    Antibody Screen  Negative  07/16/24 1521    Varicella IgG  691 index 07/16/24 1521    Rubella  3.14 index 07/16/24 1521    Hgb  10.5 g/dL 11/27/24 1354       12.1 g/dL 08/13/24 1354       12.7 g/dL 07/16/24 1521    Hct  31.0 % 11/27/24 1354       35.6 % 08/13/24 1354       38.1 % 07/16/24 1521    HgB A1c   4.9 % 07/16/24 1521    1h GTT  113 mg/dL 11/27/24 1354    3h GTT Fasting       3h GTT 1 hour       3h GTT 2 hour       3h GTT 3 hour        Gonorrhea (discrete)  Negative  07/16/24 1521    Chlamydia (discrete)  Negative  07/16/24 1521    RPR  Non Reactive  11/27/24 1354       Non Reactive  07/16/24 1521    Syphils cascade: TP-Ab (FTA)       TP-Ab       TP-Ab (EIA)        TPPA       HBsAg  Negative  24 1521    Herpes Simplex Virus PCR       Herpes Simplex VIrus Culture       HIV  Non Reactive  24 1521    Hep C RNA Quant PCR       Hep C Antibody       AFP       NIPT       Cystic Fibrosis ([place])       Cystic Fibroisis        Spinal Muscular atrophy       Fragile X       Group B Strep       GBS Susceptibility to Clindamycin       GBS Susceptibility to Erythromycin       Fetal Fibronectin       Genetic Testing, Maternal Blood                 Drug Screening       Test Value Date Time    Urine Drug Screen       Amphetamine Screen       Barbiturate Screen       Benzodiazepine Screen       Methadone Screen       Phencyclidine Screen       Opiates Screen       THC Screen       Cocaine Screen       Propoxyphene Screen       Buprenorphine Screen       Methamphetamine Screen       Oxycodone Screen       Tricyclic Antidepressants Screen                 Legend    ^: Historical                             Past OB History:     OB History    Para Term  AB Living   2 1 1 0 0 1   SAB IAB Ectopic Molar Multiple Live Births   0 0 0 0 0 1      # Outcome Date GA Lbr Berto/2nd Weight Sex Type Anes PTL Lv   2 Current            1 Term 22 39w4d / 01:17 3510 g (7 lb 11.8 oz) M Vag-Spont EPI N XENIA      Name: DAY,ROBERTOJAYDONBO      Apgar1: 8  Apgar5: 9       Past Medical History: Past Medical History:   Diagnosis Date    COVID-2020--flu-like symptoms, not hospitalized     Crampy pain associated with menses     Herpes     PONV (postoperative nausea and vomiting)     Second degree perineal laceration during delivery 2022      Past Surgical History Past Surgical History:   Procedure Laterality Date    DIAGNOSTIC LAPAROSCOPY N/A 2021    Procedure: DIAGNOSTIC LAPAROSCOPY;  Surgeon: Joseph Padilla MD;  Location: [facility];  Service: Obstetrics/Gynecology;  Laterality: N/A;    HAND SURGERY      fracture    KNEE ARTHROSCOPY      TONSILLECTOMY      WRIST  ARTHROSCOPY        Family History: Family History   Problem Relation Age of Onset    Breast cancer Neg Hx     Ovarian cancer Neg Hx     Uterine cancer Neg Hx     Colon cancer Neg Hx       Social History:  reports that she has never smoked. She has never used smokeless tobacco.   reports no history of alcohol use.   reports no history of drug use.           High Risk Medical Conditions/Complaints this visit: none    Discussion of Management or Test Interpretation with physician/provider/healthcare provider: No    External Records Reviewed: Prenatal history in [facility] from [facility] OB provider - ANNETTE Cuevas reviewed, pregnancy complicated by: n/a    Review Previous Test Results: Prenatal labs in [facility] reviewed, history of: anemia    Independent Historian: None    Social Determinants to Health: No drug, transportation or housing or other issues noted       Objective   Medical Decision Making:  Amount/Complexity of Data Reviewed  -Labs ordered - UA  -Imaging ordered  -IV fluids given - po hydrate  Risk:  Prescription drug management  Drug therapy requiring intensive monitoring for toxicity  Decision to admit patient - no  Diagnosis/Treatment limited by social determinants    Min/max vitals past 24 hours:  Temp  Min: 97.8 °F (36.6 °C)  Max: 97.8 °F (36.6 °C)   BP  Min: 113/68  Max: 113/68   Pulse  Min: 82  Max: 82   Resp  Min: 16  Max: 16        Independent Interpretation NST/FHT's: reassuring and category 1.  external monitors used   Cervix: was checked (by me): 0 cm / 50 % / -3   Contractions:    Review of current test: results irregular    normal       Final Diagnoses:        Assessment   IUP at 35w0d  False labor before 37 weeks  Audible irregular fetal heart rhythm     Plan   MFM consult  Expectant management  Follow up with primary OB    Glendy Andrade MD  1/13/2025  13:04 CST

## 2025-01-13 NOTE — CONSULTS
MFM Consult    Requested by Dr Andrade      Patient Active Problem List    Diagnosis Date Noted    Fetal arrhythmia affecting pregnancy, antepartum 2025     Fetal arrhythmia affecting pregnancy, antepartum  30yo   Currently 35 0/7 weeks    PNC uncomplicated  Came in today secondary to possible labor.   On evaluation, a fetal arrhythmia noted.     VSS  Clinically euthyroid    An US was performed today.   Please see AS for the full report.   Vertex presentation.   Overall size is consistent with dates. Interval growth appropriate. EFW 2486g, 40%.  Anatomy reviewed; no structural anomalies visualized. All structural views of the heart appear normal.   Normal fluid.   A majority of the the time a sinus rhythm is visualized but there is a cardiac arrythmia seen today. It is most consistent with PACs with compensatory pauses. Approximately 5-10 extra beats are seen each minute. At no time during the evaluation was there a tachyarrythmia visualized.   No hydrops.   BPP     Findings discussed with the couple.     We discussed the electrical and mechanical basis for the heart rhythm and how a fetal arrhythmia can be generated.   The primary concern is for degeneration of a rhythm into a sustained fetal tachyarrhythmia.   With a structurally normal heart, the likelihood for a tachyarrhythmia is below 1%.     Maternal supplements, thyroid status and caffeine intake can all increase the likelihood for ectopic beats. Recommend assessing the maternal thyroid status if not recently done.     Per the nurses, the tracing of the baby is interpretable. Cat I.   Despite the fetal arrhythmia, would not recommend altering management decisions regarding her labor.     If remains undelivered, recommend weekly NST to assess for tachyarrythmia.   Recommend weekly FHT 30-60 minutes to assess for any signs of tachyarrythmia. If concern for a tachyarrythmia, please contact us.   Post-delivery, the great majority of these rhythm  alterations are extinguished with the change in pressures within the heart.   Advising the nursery team of the arrhythmia recommended.

## 2025-01-13 NOTE — TELEPHONE ENCOUNTER
Pt 35 weeks called stating that she has been having increased contractions. Pt voiced that baby is not moving a whole lot but she is feeling movement. Pt denied leaking or bleeding but did say she has had an increase in discharge. Pt advised to go to LDR for eval.

## 2025-01-14 LAB — BACTERIA SPEC AEROBE CULT: NORMAL

## 2025-01-21 ENCOUNTER — ROUTINE PRENATAL (OUTPATIENT)
Age: 30
End: 2025-01-21
Payer: COMMERCIAL

## 2025-01-21 VITALS — WEIGHT: 183 LBS | DIASTOLIC BLOOD PRESSURE: 74 MMHG | SYSTOLIC BLOOD PRESSURE: 112 MMHG | BODY MASS INDEX: 27.02 KG/M2

## 2025-01-21 DIAGNOSIS — O36.8390 FETAL ARRHYTHMIA AFFECTING PREGNANCY, ANTEPARTUM: ICD-10-CM

## 2025-01-21 DIAGNOSIS — Z3A.36 36 WEEKS GESTATION OF PREGNANCY: Primary | ICD-10-CM

## 2025-01-21 LAB
GLUCOSE UR STRIP-MCNC: NEGATIVE MG/DL
PROT UR STRIP-MCNC: NEGATIVE MG/DL

## 2025-01-21 NOTE — PROGRESS NOTES
GFM. Still having ctx. Denies VB, LOF.  Went to L&D last week with PACs.  Saw Dr. Medeiros with normal growth US.  Weekly Nst recommended.     /74   Wt 83 kg (183 lb)   LMP 05/15/2024   BMI 27.02 kg/m²    FHTs 158  Urine protein and glucose negative   Cervix:  1/50/-2 soft      Diagnoses and all orders for this visit:    1. 36 weeks gestation of pregnancy (Primary)  -     Chlamydia trachomatis, Neisseria gonorrhoeae, PCR w/ confirmation - Swab, Vagina  -     Strep B Screen - , Vaginal/Rectum  -     POC Urinalysis Dipstick  IUP at 36 weeks gestation, doing well. RTC weekly with NST.  Nst today.  GBS today. L&D precautions.   2. Fetal arrhythmia affecting pregnancy, antepartum

## 2025-01-24 LAB
C TRACH RRNA SPEC QL NAA+PROBE: NEGATIVE
GP B STREP DNA SPEC QL NAA+PROBE: NEGATIVE
N GONORRHOEA RRNA SPEC QL NAA+PROBE: NEGATIVE

## 2025-01-29 ENCOUNTER — ROUTINE PRENATAL (OUTPATIENT)
Age: 30
End: 2025-01-29
Payer: COMMERCIAL

## 2025-01-29 VITALS — DIASTOLIC BLOOD PRESSURE: 80 MMHG | BODY MASS INDEX: 27.2 KG/M2 | WEIGHT: 184.2 LBS | SYSTOLIC BLOOD PRESSURE: 118 MMHG

## 2025-01-29 DIAGNOSIS — Z3A.37 37 WEEKS GESTATION OF PREGNANCY: Primary | ICD-10-CM

## 2025-01-29 NOTE — PROGRESS NOTES
Having bouts of ctx. GFM. Denies VB, LOF.     /80   Wt 83.6 kg (184 lb 3.2 oz)   LMP 05/15/2024   BMI 27.20 kg/m²    FHTs 143  Urine protein and glucose negative  Cervix: 1-2/50/-2 soft    Diagnoses and all orders for this visit:    1. 37 weeks gestation of pregnancy (Primary)  IUP at 37 weeks gestation, doing well. L&D precautions. RTC weekly.

## 2025-02-04 ENCOUNTER — ROUTINE PRENATAL (OUTPATIENT)
Age: 30
End: 2025-02-04
Payer: COMMERCIAL

## 2025-02-04 VITALS — SYSTOLIC BLOOD PRESSURE: 118 MMHG | WEIGHT: 185.8 LBS | BODY MASS INDEX: 27.44 KG/M2 | DIASTOLIC BLOOD PRESSURE: 76 MMHG

## 2025-02-04 DIAGNOSIS — Z3A.38 38 WEEKS GESTATION OF PREGNANCY: Primary | ICD-10-CM

## 2025-02-04 NOTE — PROGRESS NOTES
GFM. Denies VB, LOF, ctx. Trying to decide about induction if she reaches 39 weeks.     /76   Wt 84.3 kg (185 lb 12.8 oz)   LMP 05/15/2024   BMI 27.44 kg/m²    FHTs 141  Urine protein and glucose negative  Cervix 2/50/-2 soft    Diagnoses and all orders for this visit:    1. 38 weeks gestation of pregnancy (Primary)  IUP at 38 weeks gestation, doing well. L&D precautions. RTC weekly.

## 2025-02-05 RX ORDER — SODIUM CHLORIDE 9 MG/ML
40 INJECTION, SOLUTION INTRAVENOUS AS NEEDED
OUTPATIENT
Start: 2025-02-05

## 2025-02-05 RX ORDER — OXYTOCIN/0.9 % SODIUM CHLORIDE 30/500 ML
250 PLASTIC BAG, INJECTION (ML) INTRAVENOUS CONTINUOUS
OUTPATIENT
Start: 2025-02-05 | End: 2025-02-05

## 2025-02-05 RX ORDER — BUTORPHANOL TARTRATE 1 MG/ML
1 INJECTION, SOLUTION INTRAMUSCULAR; INTRAVENOUS
OUTPATIENT
Start: 2025-02-05

## 2025-02-05 RX ORDER — SODIUM CHLORIDE 0.9 % (FLUSH) 0.9 %
10 SYRINGE (ML) INJECTION EVERY 12 HOURS SCHEDULED
OUTPATIENT
Start: 2025-02-05

## 2025-02-05 RX ORDER — HYDROMORPHONE HYDROCHLORIDE 1 MG/ML
1 INJECTION, SOLUTION INTRAMUSCULAR; INTRAVENOUS; SUBCUTANEOUS
OUTPATIENT
Start: 2025-02-05 | End: 2025-02-15

## 2025-02-05 RX ORDER — TERBUTALINE SULFATE 1 MG/ML
0.25 INJECTION, SOLUTION SUBCUTANEOUS AS NEEDED
OUTPATIENT
Start: 2025-02-05

## 2025-02-05 RX ORDER — IBUPROFEN 200 MG
600 TABLET ORAL EVERY 6 HOURS PRN
OUTPATIENT
Start: 2025-02-05

## 2025-02-05 RX ORDER — ONDANSETRON 2 MG/ML
4 INJECTION INTRAMUSCULAR; INTRAVENOUS EVERY 6 HOURS PRN
OUTPATIENT
Start: 2025-02-05

## 2025-02-05 RX ORDER — OXYTOCIN/0.9 % SODIUM CHLORIDE 30/500 ML
999 PLASTIC BAG, INJECTION (ML) INTRAVENOUS ONCE
OUTPATIENT
Start: 2025-02-05 | End: 2025-02-05

## 2025-02-05 RX ORDER — CALCIUM CARBONATE 500 MG/1
2 TABLET, CHEWABLE ORAL 3 TIMES DAILY PRN
OUTPATIENT
Start: 2025-02-05

## 2025-02-05 RX ORDER — ONDANSETRON 4 MG/1
4 TABLET, ORALLY DISINTEGRATING ORAL EVERY 6 HOURS PRN
OUTPATIENT
Start: 2025-02-05

## 2025-02-05 RX ORDER — SODIUM CHLORIDE, SODIUM LACTATE, POTASSIUM CHLORIDE, CALCIUM CHLORIDE 600; 310; 30; 20 MG/100ML; MG/100ML; MG/100ML; MG/100ML
125 INJECTION, SOLUTION INTRAVENOUS CONTINUOUS
OUTPATIENT
Start: 2025-02-05 | End: 2025-02-05

## 2025-02-05 RX ORDER — METHYLERGONOVINE MALEATE 0.2 MG/ML
200 INJECTION INTRAVENOUS ONCE AS NEEDED
OUTPATIENT
Start: 2025-02-05

## 2025-02-05 RX ORDER — ACETAMINOPHEN 325 MG/1
650 TABLET ORAL EVERY 4 HOURS PRN
OUTPATIENT
Start: 2025-02-05

## 2025-02-05 RX ORDER — OXYTOCIN/0.9 % SODIUM CHLORIDE 30/500 ML
2-6 PLASTIC BAG, INJECTION (ML) INTRAVENOUS
OUTPATIENT
Start: 2025-02-05

## 2025-02-05 RX ORDER — MISOPROSTOL 100 UG/1
800 TABLET ORAL AS NEEDED
OUTPATIENT
Start: 2025-02-05

## 2025-02-05 RX ORDER — SODIUM CHLORIDE 0.9 % (FLUSH) 0.9 %
10 SYRINGE (ML) INJECTION AS NEEDED
OUTPATIENT
Start: 2025-02-05

## 2025-02-05 RX ORDER — CARBOPROST TROMETHAMINE 250 UG/ML
250 INJECTION, SOLUTION INTRAMUSCULAR AS NEEDED
OUTPATIENT
Start: 2025-02-05

## 2025-02-05 RX ORDER — BUTORPHANOL TARTRATE 1 MG/ML
2 INJECTION, SOLUTION INTRAMUSCULAR; INTRAVENOUS
OUTPATIENT
Start: 2025-02-05

## 2025-02-05 RX ORDER — CITRIC ACID/SODIUM CITRATE 334-500MG
30 SOLUTION, ORAL ORAL ONCE AS NEEDED
OUTPATIENT
Start: 2025-02-05

## 2025-02-05 NOTE — H&P (VIEW-ONLY)
[facility]  Yumi Swann  : 1995  MRN: [id number]  CSN: [id number]    History and Physical    Subjective   Yumi Swann is a 29 y.o. year old  with an Estimated Date of Delivery: 25 scheduled for induction of labor on 2025 due to elective - patient's request.  Prenatal care has been with Dr. Helen Cuevas.  It has been episode of fetal arrhythmia, however it has normalized and has not been noted since 35 weeks. Otherwise PNC normal. .    OB History    Para Term  AB Living   2 1 1 0 0 1   SAB IAB Ectopic Molar Multiple Live Births   0 0 0 0 0 1      # Outcome Date GA Lbr Berto/2nd Weight Sex Type Anes PTL Lv   2 Current            1 Term 22 39w4d / 01:17 3510 g (7 lb 11.8 oz) M Vag-Spont EPI N XENIA      Name: SHAHBAZ SWANN      Apgar1: 8  Apgar5: 9     Past Medical History:   Diagnosis Date    COVID-2020--flu-like symptoms, not hospitalized     Crampy pain associated with menses     Herpes     PONV (postoperative nausea and vomiting)     Second degree perineal laceration during delivery 2022     Past Surgical History:   Procedure Laterality Date    DIAGNOSTIC LAPAROSCOPY N/A 2021    Procedure: DIAGNOSTIC LAPAROSCOPY;  Surgeon: Joseph Padilla MD;  Location: [facility];  Service: Obstetrics/Gynecology;  Laterality: N/A;    HAND SURGERY      fracture    KNEE ARTHROSCOPY      TONSILLECTOMY      WRIST ARTHROSCOPY         Current Outpatient Medications:     ferrous sulfate 324 (65 Fe) MG tablet delayed-release EC tablet, Take 1 tablet by mouth Daily With Breakfast., Disp: , Rfl:     ondansetron ODT (ZOFRAN-ODT) 4 MG disintegrating tablet, Place 1 tablet on the tongue Every 8 (Eight) Hours As Needed for Nausea or Vomiting., Disp: 30 tablet, Rfl: 1    prenatal vitamin (prenatal, CLASSIC, vitamin) tablet, Take 1 tablet by mouth Daily., Disp: , Rfl:     Allergies   Allergen Reactions    Cephalosporins Other (See Comments)     Unknown        Social History    Tobacco Use      Smoking status: Never      Smokeless tobacco: Never    Review of Systems      Objective   /76   Wt 84.3 kg (185 lb 12.8 oz)   LMP 05/15/2024   BMI 27.44 kg/m²   General: well developed; well nourished  no acute distress  mentation appropriate   Heart: regular rate and rhythm, S1, S2 normal, no murmur, click, rub or gallop   Lungs: breathing is unlabored   Abdomen:  Cervix:  Presentation:  EFW by Leopold's:  EFW by recent u/s: soft, non-tender; no masses  no umbilical or inguinal hernias are present  no hepato-splenomegaly  was checked (by me): 2 cm / 50 % / -2  Vertex         Prenatal Labs  Lab Results   Component Value Date    HGB 10.5 (L) 2024    HEPBSAG Negative 2024    ABO O 2024    RH Positive 2024    ABSCRN Negative 2024    [id number] Non Reactive 2024    URINECX 25,000 CFU/mL Normal Urogenital Tammi 2025       Recent Labs  Lab Results   Component Value Date    HGB 10.5 (L) 2024    HCT 31.0 (L) 2024    WBC 12.6 (H) 2024     2024           Assessment   IUP with an Estimated Date of Delivery: 25  Induction of labor scheduled on 2025 for elective - patient's request.  The patient's pelvis feels clinically adequate for IOL to be appropriate, although she understands that this clinical judgement is not always accurate. Pt with prior  x 1.  Pt is aware I will not be available for induction but desires induction on .   Group B Strep status: negative         Plan   Risks and benefits of induction discussed.  Patient understands that IOL increases the risk of  delivery over spontaneous labor, especially if the patient does not have a favorable cervix. Cervix is favorable and pt with prior .  Anticipate .     Helen Cuevas MD  2025

## 2025-02-05 NOTE — H&P
[facility]  Yumi Swann  : 1995  MRN: [id number]  CSN: [id number]    History and Physical    Subjective   Yumi Swann is a 29 y.o. year old  with an Estimated Date of Delivery: 25 scheduled for induction of labor on 2025 due to elective - patient's request.  Prenatal care has been with Dr. Helen Cuevas.  It has been episode of fetal arrhythmia, however it has normalized and has not been noted since 35 weeks. Otherwise PNC normal. .    OB History    Para Term  AB Living   2 1 1 0 0 1   SAB IAB Ectopic Molar Multiple Live Births   0 0 0 0 0 1      # Outcome Date GA Lbr Berto/2nd Weight Sex Type Anes PTL Lv   2 Current            1 Term 22 39w4d / 01:17 3510 g (7 lb 11.8 oz) M Vag-Spont EPI N XENIA      Name: SHAHBAZ SWANN      Apgar1: 8  Apgar5: 9     Past Medical History:   Diagnosis Date    COVID-2020--flu-like symptoms, not hospitalized     Crampy pain associated with menses     Herpes     PONV (postoperative nausea and vomiting)     Second degree perineal laceration during delivery 2022     Past Surgical History:   Procedure Laterality Date    DIAGNOSTIC LAPAROSCOPY N/A 2021    Procedure: DIAGNOSTIC LAPAROSCOPY;  Surgeon: Joseph Padilla MD;  Location: [facility];  Service: Obstetrics/Gynecology;  Laterality: N/A;    HAND SURGERY      fracture    KNEE ARTHROSCOPY      TONSILLECTOMY      WRIST ARTHROSCOPY         Current Outpatient Medications:     ferrous sulfate 324 (65 Fe) MG tablet delayed-release EC tablet, Take 1 tablet by mouth Daily With Breakfast., Disp: , Rfl:     ondansetron ODT (ZOFRAN-ODT) 4 MG disintegrating tablet, Place 1 tablet on the tongue Every 8 (Eight) Hours As Needed for Nausea or Vomiting., Disp: 30 tablet, Rfl: 1    prenatal vitamin (prenatal, CLASSIC, vitamin) tablet, Take 1 tablet by mouth Daily., Disp: , Rfl:     Allergies   Allergen Reactions    Cephalosporins Other (See Comments)     Unknown        Social History    Tobacco Use      Smoking status: Never      Smokeless tobacco: Never    Review of Systems      Objective   /76   Wt 84.3 kg (185 lb 12.8 oz)   LMP 05/15/2024   BMI 27.44 kg/m²   General: well developed; well nourished  no acute distress  mentation appropriate   Heart: regular rate and rhythm, S1, S2 normal, no murmur, click, rub or gallop   Lungs: breathing is unlabored   Abdomen:  Cervix:  Presentation:  EFW by Leopold's:  EFW by recent u/s: soft, non-tender; no masses  no umbilical or inguinal hernias are present  no hepato-splenomegaly  was checked (by me): 2 cm / 50 % / -2  Vertex         Prenatal Labs  Lab Results   Component Value Date    HGB 10.5 (L) 2024    HEPBSAG Negative 2024    ABO O 2024    RH Positive 2024    ABSCRN Negative 2024    [id number] Non Reactive 2024    URINECX 25,000 CFU/mL Normal Urogenital Tammi 2025       Recent Labs  Lab Results   Component Value Date    HGB 10.5 (L) 2024    HCT 31.0 (L) 2024    WBC 12.6 (H) 2024     2024           Assessment   IUP with an Estimated Date of Delivery: 25  Induction of labor scheduled on 2025 for elective - patient's request.  The patient's pelvis feels clinically adequate for IOL to be appropriate, although she understands that this clinical judgement is not always accurate. Pt with prior  x 1.  Pt is aware I will not be available for induction but desires induction on .   Group B Strep status: negative         Plan   Risks and benefits of induction discussed.  Patient understands that IOL increases the risk of  delivery over spontaneous labor, especially if the patient does not have a favorable cervix. Cervix is favorable and pt with prior .  Anticipate .     Helen Cuevas MD  2025

## 2025-02-12 ENCOUNTER — HOSPITAL ENCOUNTER (OUTPATIENT)
Dept: LABOR AND DELIVERY | Facility: HOSPITAL | Age: 30
Discharge: HOME | End: 2025-02-12
Payer: COMMERCIAL

## 2025-02-12 ENCOUNTER — HOSPITAL ENCOUNTER (INPATIENT)
Facility: HOSPITAL | Age: 30
LOS: 2 days | Discharge: HOME | End: 2025-02-14
Attending: OBSTETRICS & GYNECOLOGY | Admitting: OBSTETRICS & GYNECOLOGY
Payer: COMMERCIAL

## 2025-02-12 ENCOUNTER — ANESTHESIA (OUTPATIENT)
Dept: LABOR AND DELIVERY | Facility: HOSPITAL | Age: 30
End: 2025-02-12
Payer: COMMERCIAL

## 2025-02-12 ENCOUNTER — ANESTHESIA EVENT (OUTPATIENT)
Dept: LABOR AND DELIVERY | Facility: HOSPITAL | Age: 30
End: 2025-02-12
Payer: COMMERCIAL

## 2025-02-12 DIAGNOSIS — Z3A.38 38 WEEKS GESTATION OF PREGNANCY: ICD-10-CM

## 2025-02-12 PROBLEM — Z34.83 PRENATAL CARE, SUBSEQUENT PREGNANCY, THIRD TRIMESTER: Status: ACTIVE | Noted: 2025-02-12

## 2025-02-12 LAB
ABO GROUP BLD: NORMAL
BASOPHILS # BLD AUTO: 0.02 10*3/MM3 (ref 0–0.2)
BASOPHILS NFR BLD AUTO: 0.2 % (ref 0–1.5)
BLD GP AB SCN SERPL QL: NEGATIVE
DEPRECATED RDW RBC AUTO: 46.2 FL (ref 37–54)
EOSINOPHIL # BLD AUTO: 0.13 10*3/MM3 (ref 0–0.4)
EOSINOPHIL NFR BLD AUTO: 1.3 % (ref 0.3–6.2)
ERYTHROCYTE [DISTWIDTH] IN BLOOD BY AUTOMATED COUNT: 13.8 % (ref 12.3–15.4)
HCT VFR BLD AUTO: 34.2 % (ref 34–46.6)
HGB BLD-MCNC: 11.5 G/DL (ref 12–15.9)
IMM GRANULOCYTES # BLD AUTO: 0.08 10*3/MM3 (ref 0–0.05)
IMM GRANULOCYTES NFR BLD AUTO: 0.8 % (ref 0–0.5)
LYMPHOCYTES # BLD AUTO: 1.36 10*3/MM3 (ref 0.7–3.1)
LYMPHOCYTES NFR BLD AUTO: 13.4 % (ref 19.6–45.3)
MCH RBC QN AUTO: 30.8 PG (ref 26.6–33)
MCHC RBC AUTO-ENTMCNC: 33.6 G/DL (ref 31.5–35.7)
MCV RBC AUTO: 91.7 FL (ref 79–97)
MONOCYTES # BLD AUTO: 0.71 10*3/MM3 (ref 0.1–0.9)
MONOCYTES NFR BLD AUTO: 7 % (ref 5–12)
NEUTROPHILS NFR BLD AUTO: 7.85 10*3/MM3 (ref 1.7–7)
NEUTROPHILS NFR BLD AUTO: 77.3 % (ref 42.7–76)
NRBC BLD AUTO-RTO: 0 /100 WBC (ref 0–0.2)
PLATELET # BLD AUTO: 230 10*3/MM3 (ref 140–450)
PMV BLD AUTO: 9.8 FL (ref 6–12)
RBC # BLD AUTO: 3.73 10*6/MM3 (ref 3.77–5.28)
RH BLD: POSITIVE
T&S EXPIRATION DATE: NORMAL
TREPONEMA PALLIDUM IGG+IGM AB [PRESENCE] IN SERUM OR PLASMA BY IMMUNOASSAY: NORMAL
WBC NRBC COR # BLD AUTO: 10.15 10*3/MM3 (ref 3.4–10.8)

## 2025-02-12 PROCEDURE — 25010000002 ROPIVACAINE PER 1 MG

## 2025-02-12 PROCEDURE — 86850 RBC ANTIBODY SCREEN: CPT | Performed by: OBSTETRICS & GYNECOLOGY

## 2025-02-12 PROCEDURE — 86780 TREPONEMA PALLIDUM: CPT | Performed by: OBSTETRICS & GYNECOLOGY

## 2025-02-12 PROCEDURE — 85025 COMPLETE CBC W/AUTO DIFF WBC: CPT | Performed by: OBSTETRICS & GYNECOLOGY

## 2025-02-12 PROCEDURE — 3E033VJ INTRODUCTION OF OTHER HORMONE INTO PERIPHERAL VEIN, PERCUTANEOUS APPROACH: ICD-10-PCS | Performed by: OBSTETRICS & GYNECOLOGY

## 2025-02-12 PROCEDURE — 86900 BLOOD TYPING SEROLOGIC ABO: CPT | Performed by: OBSTETRICS & GYNECOLOGY

## 2025-02-12 PROCEDURE — 25010000002 BUTORPHANOL PER 1 MG: Performed by: OBSTETRICS & GYNECOLOGY

## 2025-02-12 PROCEDURE — 25810000003 LACTATED RINGERS SOLUTION: Performed by: OBSTETRICS & GYNECOLOGY

## 2025-02-12 PROCEDURE — 25810000003 LACTATED RINGERS PER 1000 ML: Performed by: OBSTETRICS & GYNECOLOGY

## 2025-02-12 PROCEDURE — 25010000002 ONDANSETRON PER 1 MG: Performed by: OBSTETRICS & GYNECOLOGY

## 2025-02-12 PROCEDURE — C1755 CATHETER, INTRASPINAL: HCPCS

## 2025-02-12 PROCEDURE — 86901 BLOOD TYPING SEROLOGIC RH(D): CPT | Performed by: OBSTETRICS & GYNECOLOGY

## 2025-02-12 RX ORDER — SODIUM CHLORIDE 0.9 % (FLUSH) 0.9 %
10 SYRINGE (ML) INJECTION AS NEEDED
Status: DISCONTINUED | OUTPATIENT
Start: 2025-02-12 | End: 2025-02-13 | Stop reason: HOSPADM

## 2025-02-12 RX ORDER — SODIUM CHLORIDE, SODIUM LACTATE, POTASSIUM CHLORIDE, CALCIUM CHLORIDE 600; 310; 30; 20 MG/100ML; MG/100ML; MG/100ML; MG/100ML
125 INJECTION, SOLUTION INTRAVENOUS CONTINUOUS
Status: DISCONTINUED | OUTPATIENT
Start: 2025-02-12 | End: 2025-02-13

## 2025-02-12 RX ORDER — SODIUM CHLORIDE 9 MG/ML
40 INJECTION, SOLUTION INTRAVENOUS AS NEEDED
Status: DISCONTINUED | OUTPATIENT
Start: 2025-02-12 | End: 2025-02-13 | Stop reason: HOSPADM

## 2025-02-12 RX ORDER — LIDOCAINE HYDROCHLORIDE AND EPINEPHRINE 15; 5 MG/ML; UG/ML
INJECTION, SOLUTION EPIDURAL
Status: COMPLETED | OUTPATIENT
Start: 2025-02-12 | End: 2025-02-12

## 2025-02-12 RX ORDER — BUTORPHANOL TARTRATE 2 MG/ML
1 INJECTION, SOLUTION INTRAMUSCULAR; INTRAVENOUS
Status: DISCONTINUED | OUTPATIENT
Start: 2025-02-12 | End: 2025-02-13 | Stop reason: HOSPADM

## 2025-02-12 RX ORDER — OXYTOCIN/0.9 % SODIUM CHLORIDE 30/500 ML
2-20 PLASTIC BAG, INJECTION (ML) INTRAVENOUS
Status: DISCONTINUED | OUTPATIENT
Start: 2025-02-12 | End: 2025-02-13 | Stop reason: HOSPADM

## 2025-02-12 RX ORDER — ACETAMINOPHEN 325 MG/1
650 TABLET ORAL EVERY 4 HOURS PRN
Status: DISCONTINUED | OUTPATIENT
Start: 2025-02-12 | End: 2025-02-13 | Stop reason: HOSPADM

## 2025-02-12 RX ORDER — CITRIC ACID/SODIUM CITRATE 334-500MG
30 SOLUTION, ORAL ORAL ONCE AS NEEDED
Status: DISCONTINUED | OUTPATIENT
Start: 2025-02-12 | End: 2025-02-13 | Stop reason: HOSPADM

## 2025-02-12 RX ORDER — SODIUM CHLORIDE 0.9 % (FLUSH) 0.9 %
10 SYRINGE (ML) INJECTION EVERY 12 HOURS SCHEDULED
Status: DISCONTINUED | OUTPATIENT
Start: 2025-02-12 | End: 2025-02-13 | Stop reason: HOSPADM

## 2025-02-12 RX ORDER — ONDANSETRON 4 MG/1
4 TABLET, ORALLY DISINTEGRATING ORAL EVERY 6 HOURS PRN
Status: DISCONTINUED | OUTPATIENT
Start: 2025-02-12 | End: 2025-02-13 | Stop reason: HOSPADM

## 2025-02-12 RX ORDER — ROPIVACAINE HYDROCHLORIDE 2 MG/ML
INJECTION, SOLUTION EPIDURAL; INFILTRATION; PERINEURAL AS NEEDED
Status: DISCONTINUED | OUTPATIENT
Start: 2025-02-12 | End: 2025-02-13 | Stop reason: SURG

## 2025-02-12 RX ORDER — SODIUM CHLORIDE, SODIUM LACTATE, POTASSIUM CHLORIDE, CALCIUM CHLORIDE 600; 310; 30; 20 MG/100ML; MG/100ML; MG/100ML; MG/100ML
125 INJECTION, SOLUTION INTRAVENOUS CONTINUOUS
Status: DISPENSED | OUTPATIENT
Start: 2025-02-12 | End: 2025-02-12

## 2025-02-12 RX ORDER — BUTORPHANOL TARTRATE 2 MG/ML
2 INJECTION, SOLUTION INTRAMUSCULAR; INTRAVENOUS
Status: DISCONTINUED | OUTPATIENT
Start: 2025-02-12 | End: 2025-02-13 | Stop reason: HOSPADM

## 2025-02-12 RX ORDER — TERBUTALINE SULFATE 1 MG/ML
0.25 INJECTION, SOLUTION SUBCUTANEOUS AS NEEDED
Status: DISCONTINUED | OUTPATIENT
Start: 2025-02-12 | End: 2025-02-13 | Stop reason: HOSPADM

## 2025-02-12 RX ORDER — ONDANSETRON 2 MG/ML
4 INJECTION INTRAMUSCULAR; INTRAVENOUS EVERY 6 HOURS PRN
Status: DISCONTINUED | OUTPATIENT
Start: 2025-02-12 | End: 2025-02-13 | Stop reason: HOSPADM

## 2025-02-12 RX ADMIN — Medication 2 MILLI-UNITS/MIN: at 06:58

## 2025-02-12 RX ADMIN — ROPIVACAINE HYDROCHLORIDE 6 ML/HR: 2 INJECTION, SOLUTION EPIDURAL; INFILTRATION at 20:30

## 2025-02-12 RX ADMIN — ROPIVACAINE HYDROCHLORIDE 6 ML: 2 INJECTION, SOLUTION EPIDURAL; INFILTRATION at 20:26

## 2025-02-12 RX ADMIN — ONDANSETRON 4 MG: 2 INJECTION INTRAMUSCULAR; INTRAVENOUS at 23:53

## 2025-02-12 RX ADMIN — SODIUM CHLORIDE, POTASSIUM CHLORIDE, SODIUM LACTATE AND CALCIUM CHLORIDE 1000 ML: 600; 310; 30; 20 INJECTION, SOLUTION INTRAVENOUS at 13:45

## 2025-02-12 RX ADMIN — SODIUM CHLORIDE, POTASSIUM CHLORIDE, SODIUM LACTATE AND CALCIUM CHLORIDE 1000 ML: 600; 310; 30; 20 INJECTION, SOLUTION INTRAVENOUS at 20:00

## 2025-02-12 RX ADMIN — BUTORPHANOL TARTRATE 1 MG: 2 INJECTION, SOLUTION INTRAMUSCULAR; INTRAVENOUS at 19:21

## 2025-02-12 RX ADMIN — LIDOCAINE HYDROCHLORIDE AND EPINEPHRINE 3 ML: 15; 5 INJECTION, SOLUTION EPIDURAL at 20:23

## 2025-02-12 RX ADMIN — SODIUM CHLORIDE, POTASSIUM CHLORIDE, SODIUM LACTATE AND CALCIUM CHLORIDE 125 ML/HR: 600; 310; 30; 20 INJECTION, SOLUTION INTRAVENOUS at 20:43

## 2025-02-12 NOTE — PLAN OF CARE
Goal Outcome Evaluation:  Plan of Care Reviewed With: patient        Progress: no change  Outcome Evaluation: pt presents for elective IOL, plan for pitocin per protocol. cervical exam 2/70/-2, AROM attempted, little fluid return seen. plans to breastfeed. significant other and mother at bedside.

## 2025-02-12 NOTE — INTERVAL H&P NOTE
H&P reviewed. The patient was examined and there are no changes to the H&P.  FHT's 145 + accels, no decels, moderate variability  Cx exam by me, now: 2/70/-2.  AROM with scant clear fluid  Will start pitocin per protocol

## 2025-02-12 NOTE — PROGRESS NOTES
Liz Ramirez Day  : 1995  MRN: [id number]  CSN: [id number]    Labor progress note    Subjective   Patient currently reports is feeling more-painful contractions, but still managing without an epidural.     Objective   Min/max vitals past 24 hours:  Temp  Min: 97.9 °F (36.6 °C)  Max: 98.6 °F (37 °C)   BP  Min: 99/77  Max: 118/76   Pulse  Min: 77  Max: 90   Resp  Min: 16  Max: 18        FHT's: reactive, reassuring.  130 +accels, no decels, moderate variability   Cervix: was checked (by me): 4 cm / 80 % / -2   Contractions: regular          Assessment   IUP at 39w2d  Elective IOL  GBS negative  Fetus reassuring     Plan   Continue with induction    Alona Powell MD  2025  15:13 CST

## 2025-02-13 PROBLEM — Z34.83 PRENATAL CARE, SUBSEQUENT PREGNANCY, THIRD TRIMESTER: Status: RESOLVED | Noted: 2025-02-12 | Resolved: 2025-02-13

## 2025-02-13 PROBLEM — Z64.1 MULTIGRAVIDA: Status: RESOLVED | Noted: 2024-07-16 | Resolved: 2025-02-13

## 2025-02-13 PROBLEM — Z34.90 PREGNANCY: Status: RESOLVED | Noted: 2024-07-16 | Resolved: 2025-02-13

## 2025-02-13 PROCEDURE — 0HQ9XZZ REPAIR PERINEUM SKIN, EXTERNAL APPROACH: ICD-10-PCS | Performed by: OBSTETRICS & GYNECOLOGY

## 2025-02-13 PROCEDURE — 51702 INSERT TEMP BLADDER CATH: CPT

## 2025-02-13 PROCEDURE — 88307 TISSUE EXAM BY PATHOLOGIST: CPT | Performed by: OBSTETRICS & GYNECOLOGY

## 2025-02-13 RX ORDER — TRAMADOL HYDROCHLORIDE 50 MG/1
50 TABLET ORAL EVERY 6 HOURS PRN
Status: DISCONTINUED | OUTPATIENT
Start: 2025-02-13 | End: 2025-02-14 | Stop reason: HOSPADM

## 2025-02-13 RX ORDER — OXYTOCIN/0.9 % SODIUM CHLORIDE 30/500 ML
125 PLASTIC BAG, INJECTION (ML) INTRAVENOUS ONCE AS NEEDED
Status: DISCONTINUED | OUTPATIENT
Start: 2025-02-13 | End: 2025-02-14 | Stop reason: HOSPADM

## 2025-02-13 RX ORDER — OXYTOCIN/0.9 % SODIUM CHLORIDE 30/500 ML
999 PLASTIC BAG, INJECTION (ML) INTRAVENOUS ONCE
Status: DISCONTINUED | OUTPATIENT
Start: 2025-02-13 | End: 2025-02-13 | Stop reason: HOSPADM

## 2025-02-13 RX ORDER — IBUPROFEN 600 MG/1
600 TABLET, FILM COATED ORAL EVERY 6 HOURS PRN
Status: DISCONTINUED | OUTPATIENT
Start: 2025-02-13 | End: 2025-02-13 | Stop reason: HOSPADM

## 2025-02-13 RX ORDER — MISOPROSTOL 200 UG/1
800 TABLET ORAL AS NEEDED
Status: DISCONTINUED | OUTPATIENT
Start: 2025-02-13 | End: 2025-02-13 | Stop reason: HOSPADM

## 2025-02-13 RX ORDER — HYDROCORTISONE 25 MG/G
1 CREAM TOPICAL AS NEEDED
Status: DISCONTINUED | OUTPATIENT
Start: 2025-02-13 | End: 2025-02-14 | Stop reason: HOSPADM

## 2025-02-13 RX ORDER — ACETAMINOPHEN 325 MG/1
650 TABLET ORAL EVERY 6 HOURS PRN
Status: DISCONTINUED | OUTPATIENT
Start: 2025-02-13 | End: 2025-02-14 | Stop reason: HOSPADM

## 2025-02-13 RX ORDER — ONDANSETRON 4 MG/1
4 TABLET, ORALLY DISINTEGRATING ORAL EVERY 6 HOURS PRN
Status: DISCONTINUED | OUTPATIENT
Start: 2025-02-13 | End: 2025-02-13 | Stop reason: HOSPADM

## 2025-02-13 RX ORDER — BISACODYL 10 MG
10 SUPPOSITORY, RECTAL RECTAL DAILY PRN
Status: DISCONTINUED | OUTPATIENT
Start: 2025-02-14 | End: 2025-02-14 | Stop reason: HOSPADM

## 2025-02-13 RX ORDER — CARBOPROST TROMETHAMINE 250 UG/ML
250 INJECTION, SOLUTION INTRAMUSCULAR AS NEEDED
Status: DISCONTINUED | OUTPATIENT
Start: 2025-02-13 | End: 2025-02-13 | Stop reason: HOSPADM

## 2025-02-13 RX ORDER — ONDANSETRON 2 MG/ML
4 INJECTION INTRAMUSCULAR; INTRAVENOUS EVERY 6 HOURS PRN
Status: DISCONTINUED | OUTPATIENT
Start: 2025-02-13 | End: 2025-02-14 | Stop reason: HOSPADM

## 2025-02-13 RX ORDER — NALOXONE HCL 0.4 MG/ML
0.4 VIAL (ML) INJECTION
Status: DISCONTINUED | OUTPATIENT
Start: 2025-02-13 | End: 2025-02-13 | Stop reason: ALTCHOICE

## 2025-02-13 RX ORDER — METHYLERGONOVINE MALEATE 0.2 MG/ML
200 INJECTION INTRAVENOUS ONCE
Status: DISCONTINUED | OUTPATIENT
Start: 2025-02-13 | End: 2025-02-14 | Stop reason: HOSPADM

## 2025-02-13 RX ORDER — ONDANSETRON 4 MG/1
4 TABLET, ORALLY DISINTEGRATING ORAL EVERY 8 HOURS PRN
Status: DISCONTINUED | OUTPATIENT
Start: 2025-02-13 | End: 2025-02-14 | Stop reason: HOSPADM

## 2025-02-13 RX ORDER — ONDANSETRON 2 MG/ML
4 INJECTION INTRAMUSCULAR; INTRAVENOUS EVERY 6 HOURS PRN
Status: DISCONTINUED | OUTPATIENT
Start: 2025-02-13 | End: 2025-02-13 | Stop reason: HOSPADM

## 2025-02-13 RX ORDER — CARBOPROST TROMETHAMINE 250 UG/ML
250 INJECTION, SOLUTION INTRAMUSCULAR ONCE
Status: DISCONTINUED | OUTPATIENT
Start: 2025-02-13 | End: 2025-02-14 | Stop reason: HOSPADM

## 2025-02-13 RX ORDER — SODIUM CHLORIDE 0.9 % (FLUSH) 0.9 %
1-10 SYRINGE (ML) INJECTION AS NEEDED
Status: DISCONTINUED | OUTPATIENT
Start: 2025-02-13 | End: 2025-02-14 | Stop reason: HOSPADM

## 2025-02-13 RX ORDER — OXYTOCIN/0.9 % SODIUM CHLORIDE 30/500 ML
250 PLASTIC BAG, INJECTION (ML) INTRAVENOUS CONTINUOUS
Status: ACTIVE | OUTPATIENT
Start: 2025-02-13 | End: 2025-02-13

## 2025-02-13 RX ORDER — MORPHINE SULFATE 2 MG/ML
1 INJECTION, SOLUTION INTRAMUSCULAR; INTRAVENOUS EVERY 4 HOURS PRN
Status: DISCONTINUED | OUTPATIENT
Start: 2025-02-13 | End: 2025-02-13 | Stop reason: ALTCHOICE

## 2025-02-13 RX ORDER — MISOPROSTOL 200 UG/1
600 TABLET ORAL ONCE
Status: DISCONTINUED | OUTPATIENT
Start: 2025-02-13 | End: 2025-02-14 | Stop reason: HOSPADM

## 2025-02-13 RX ORDER — METHYLERGONOVINE MALEATE 0.2 MG/ML
200 INJECTION INTRAVENOUS ONCE AS NEEDED
Status: DISCONTINUED | OUTPATIENT
Start: 2025-02-13 | End: 2025-02-13 | Stop reason: HOSPADM

## 2025-02-13 RX ORDER — CALCIUM CARBONATE 500 MG/1
2 TABLET, CHEWABLE ORAL 3 TIMES DAILY PRN
Status: DISCONTINUED | OUTPATIENT
Start: 2025-02-13 | End: 2025-02-13 | Stop reason: HOSPADM

## 2025-02-13 RX ORDER — PROMETHAZINE HYDROCHLORIDE 25 MG/1
25 TABLET ORAL EVERY 6 HOURS PRN
Status: DISCONTINUED | OUTPATIENT
Start: 2025-02-13 | End: 2025-02-14 | Stop reason: HOSPADM

## 2025-02-13 RX ORDER — IBUPROFEN 600 MG/1
600 TABLET, FILM COATED ORAL EVERY 6 HOURS PRN
Status: DISCONTINUED | OUTPATIENT
Start: 2025-02-13 | End: 2025-02-14 | Stop reason: HOSPADM

## 2025-02-13 RX ORDER — PROMETHAZINE HYDROCHLORIDE 12.5 MG/1
12.5 SUPPOSITORY RECTAL EVERY 6 HOURS PRN
Status: DISCONTINUED | OUTPATIENT
Start: 2025-02-13 | End: 2025-02-14 | Stop reason: HOSPADM

## 2025-02-13 RX ORDER — DOCUSATE SODIUM 100 MG/1
100 CAPSULE, LIQUID FILLED ORAL 2 TIMES DAILY
Status: DISCONTINUED | OUTPATIENT
Start: 2025-02-13 | End: 2025-02-14 | Stop reason: HOSPADM

## 2025-02-13 RX ORDER — CALCIUM CARBONATE 500 MG/1
2 TABLET, CHEWABLE ORAL 3 TIMES DAILY PRN
Status: DISCONTINUED | OUTPATIENT
Start: 2025-02-13 | End: 2025-02-14 | Stop reason: HOSPADM

## 2025-02-13 RX ADMIN — IBUPROFEN 600 MG: 600 TABLET, FILM COATED ORAL at 20:02

## 2025-02-13 RX ADMIN — IBUPROFEN 600 MG: 600 TABLET, FILM COATED ORAL at 08:55

## 2025-02-13 RX ADMIN — ACETAMINOPHEN 650 MG: 325 TABLET ORAL at 02:49

## 2025-02-13 RX ADMIN — Medication: at 04:48

## 2025-02-13 RX ADMIN — MISOPROSTOL 800 MCG: 200 TABLET ORAL at 02:51

## 2025-02-13 RX ADMIN — DOCUSATE SODIUM 100 MG: 100 CAPSULE, LIQUID FILLED ORAL at 20:02

## 2025-02-13 RX ADMIN — ACETAMINOPHEN 650 MG: 325 TABLET ORAL at 20:03

## 2025-02-13 RX ADMIN — DOCUSATE SODIUM 100 MG: 100 CAPSULE, LIQUID FILLED ORAL at 08:51

## 2025-02-13 RX ADMIN — Medication 250 ML/HR: at 02:06

## 2025-02-13 NOTE — PROGRESS NOTES
"      GRACE Dhillon  [facility]  [address]  [place] [address]  Office [phone]  Fax [phone]    [facility]  Vaginal Delivery Progress Note    Subjective   Postpartum Day 0: Vaginal Delivery    Feeling well and denies concerns. Having some back discomfort at the site of epidurals. Relates she was \"stuck\" x 3. Has aqua k-pad for comfort. Has comfort products for perineal laceration. Ambulating. Voiding without difficulty. Bleeding is light without clots. Breastfeeding and infant latched.    Objective     Vital Signs Range for the last 24 hours  Temperature: Temp:  [97.1 °F (36.2 °C)-99.3 °F (37.4 °C)] 97.1 °F (36.2 °C)   Temp Source: Temp src: Temporal   BP: BP: ()/(52-93) 105/64   Pulse: Heart Rate:  [] 64   Respirations: Resp:  [16-18] 18   SPO2: SpO2:  [92 %-100 %] 98 %   O2 Amount (l/min):     O2 Devices Device (Oxygen Therapy): room air   Weight:       Admit Height:  Height: 177.8 cm (70\")      Physical Exam:  General:  no acute distresss.  Abdomen: abdomen is soft without significant tenderness, masses, organomegaly or guarding. Fundus: appropriate, firm, non tender  Extremities: normal, atraumatic, no cyanosis, and no edema    Lab results reviewed:  Admission labs reviewed. Postpartum labs due tomorrow.  Rubella:  No results found for: \"RUBELLAIGGIN\" Nurse Transcribed from prenatal record --  No components found for: \"EXTRUBELQUAL\"  Rh Status:    RH type   Date Value Ref Range Status   02/12/2025 Positive  Final     Immunizations:   Immunization History   Administered Date(s) Administered    Flu Vaccine Split Quad 11/28/2016, 10/13/2017, 10/11/2018    Hepatitis B Adult/Adolescent IM 11/08/2016, 12/08/2016    PPD Test 10/31/2016, 11/08/2016, 10/16/2017, 10/24/2017, 09/11/2018    Tdap 01/06/2025     Lab Results (last 24 hours)       ** No results found for the last 24 hours. **            External Prenatal Results       Pregnancy Outside Results - Transcribed From " Office Records - See Scanned Records For Details       Test Value Date Time    ABO  O  02/12/25 0635    Rh  Positive  02/12/25 0635    Antibody Screen  Negative  02/12/25 0635       Negative  07/16/24 1521    Varicella IgG  691 index 07/16/24 1521    Rubella  3.14 index 07/16/24 1521    Hgb  11.5 g/dL 02/12/25 0635       10.5 g/dL 11/27/24 1354       12.1 g/dL 08/13/24 1354       12.7 g/dL 07/16/24 1521    Hct  34.2 % 02/12/25 0635       31.0 % 11/27/24 1354       35.6 % 08/13/24 1354       38.1 % 07/16/24 1521    HgB A1c   4.9 % 07/16/24 1521    1h GTT  113 mg/dL 11/27/24 1354    3h GTT Fasting       3h GTT 1 hour       3h GTT 2 hour       3h GTT 3 hour        Gonorrhea (discrete)  Negative  01/21/25 1458       Negative  07/16/24 1521    Chlamydia (discrete)  Negative  01/21/25 1458       Negative  07/16/24 1521    RPR  Non Reactive  11/27/24 1354       Non Reactive  07/16/24 1521    Syphils cascade: TP-Ab (FTA)  Non-Reactive  02/12/25 0635    TP-Ab  Non-Reactive  02/12/25 0635    TP-Ab (EIA)       TPPA       HBsAg  Negative  07/16/24 1521    Herpes Simplex Virus PCR       Herpes Simplex VIrus Culture       HIV  Non Reactive  07/16/24 1521    Hep C RNA Quant PCR       Hep C Antibody       AFP       NIPT       Cystic Fibrosis ([place])       Cystic Fibroisis        Spinal Muscular atrophy       Fragile X       Group B Strep  Negative  01/21/25 1458    GBS Susceptibility to Clindamycin       GBS Susceptibility to Erythromycin       Fetal Fibronectin       Genetic Testing, Maternal Blood                 Drug Screening       Test Value Date Time    Urine Drug Screen       Amphetamine Screen       Barbiturate Screen       Benzodiazepine Screen       Methadone Screen       Phencyclidine Screen       Opiates Screen       THC Screen       Cocaine Screen       Propoxyphene Screen       Buprenorphine Screen       Methamphetamine Screen       Oxycodone Screen       Tricyclic Antidepressants Screen                 Legend    ^:  Historical                            Assessment & Plan       * No active hospital problems. *      Yumi Ramirez Day is Day 0  post-partum  Vaginal, Spontaneous  .      Plan:  Continue with postpartum plan of care. Lactation support as needed.      This note has been signed electronically.    Elza Polanco DNP, APRN, CNM  2/13/2025  13:12 CST

## 2025-02-13 NOTE — LACTATION NOTE
"Mother's Name: Yumi Phone #:[phone]  Infant Name: Dayanna Polanco :2025 @ 0153  Gestation:39w3d  Day of life:0  Birth weight:  7-9.7 (3450g) Discharge weight:  Weight Loss:   24 hour Summary of Feeds:  Voids:  Stools:  Assistive devices (shields, shells, etc):na  Significant Maternal history:, 3 yo, HSV  Maternal Concerns:    Maternal Goal: provide colostrum only per RN report  Mother's Medications: Iron, ,PNV  Breastpump for home:   Ped follow up appt:      Attempted visit with patient to provide initial breastfeeding education and discuss feeding goals. Mother currently sleeping. Will follow up later today.       Instructed patient our lactation team is here for continued support throughout their breastfeeding journey. Our team has encouraged patient to call with any questions or concerns that may arise after discharge.     1415  Returned to room, mother now awake. Clarification for feeding plan obtained. Mother now hand expressing colostrum to be given to infant but plans to switch to formula within the next day. She reports she attempted breastfeeding \"not long\" with first child, states latching issues from the beginning, was pumping and bottle feeding but infant did not tolerate breastmilk. She also reports she had engorgement and mastitis with first child. Much caution provided to monitor for milk transition, engorgement and mastitis as she is high risk for developing again. Advised as soon as she is ready to suppress, strong suppression measure should be taken, ice packs and cabbage leaves should be worn throughout the day. Formula packet given. Questions denied.    Suppression of Lactation for Non-Nursing Mothers handout    If you choose not to breastfeed, please let us know if you have any questions about whether yours was the right choice for you and your family.  While there are a very few conditions where breastfeeding is not recommended, most uses surrounding breastfeeding can be managed " with proper support.  We are here to hep and support you no matter how you choose to feed your baby.    To suppress further lactation and prevent milk from coming in-- as much as possible:  **Wear a good fitting support bra without an under wire (sports bras are good)   **Wear bra continuously day and night for about 1-2 weeks  **Avoid any kind of breast stimulation such as rubbing, warmth or massage.  ** While showering, try to stand with your back to the water. The warm water will     encourage milk flow.  **Cold compression may be applied for 20 minutes once per hour as needed.  **Anti-Inflammatory medications such as ibuprofen (Motrin) may help.  Ue per your doctors and/or manufacture instructions.  ** If you develop a fever greater than 101 F, especially if you also have flu- like symptoms and any areas of redness or swelling in your breasts, please call your physician. You may need treatment for a condition called mastitis.      The Many Benefits if Breastfeeding   Breastfeeding saves time  *Breastfeeding allows you to calm or feed your baby immediately, which leads to a happier baby who cries less  *There is nothing to buy, prepare, or maintain.There is nothing to clean or sterilize.  Breastfeeding builds a mothers confidence  *She knows all her baby needs to thrive is her!  Breastfeeding saves Money  *There is no formula to buy and healthier breast fed babies have less medical costs  Healthy Mom/Healthy baby  * babies get sick less often, and when they do they are usually sick less severely and for a shorter time  * babies have fewer ear infections  * babies have fewer allergies  *Mothers who breastfeed have a lower risk for cancer, osteoporosis, anemia, high blood pressure, obesity, and Type ll diabetes  *Mothers miss less work days with sick babies  Breast fed babies have a better dental health  * babies have better jaw development which requires lest orthodontic  work  *Breast milk does not promote cavities  * babies can nurse at night without worry of tooth decay  Breastfeeding allows a baby to reach his full IQ potential  *The longer a baby is breast fed, the better their brain development  Breast fed babies and moms are more relaxed  *The hormones released during breastfeeding have a calming effect on mothers  *Breastfeeding requires mom to take a break; this may help mom get more rest after delivery  *Breastfeeding is quicker than preparing formula which allows mom and baby to get back to sleep faster  *Breastfeeding promotes bonding and allows mom to learn babies cues and care needs more quickly  Breastfeeding cleanup is easier  *The bowel movements and spit up of breast fed babies doesn't smell as bad  *Spit-up of breast fed babies doesn't stain clothing  Getting out of the hourse is easier  *No formula bottles to prepare and carry safely   *No time restraints due to worry about what baby will eat  *No worries about warming a bottle or finding safe water to prepare bottles  Breastfeeding mother get their bodies back sooner  *The uterus shrinks more quickly and completely, which allows a flatter tummy  *Breastfeeding burns 400-500 calories a day; making milk torches stored fat!  Breastfeeding is better for the environment  *There is no trash to dispose of after breastfeeding  *There is no production facility to produce breast milk; moms body does it all without the pollution of a factory      Your Guide to formula feeding your baby by Anke, [email or web address]

## 2025-02-13 NOTE — PROGRESS NOTES
Liz Ramirez Day  : 1995  MRN: [id number]  CSN: [id number]    Labor progress note    Subjective   Patient currently reports is comfortable with the epidural     Objective   Min/max vitals past 24 hours:  Temp  Min: 97.9 °F (36.6 °C)  Max: 98.6 °F (37 °C)   BP  Min: 90/52  Max: 127/63   Pulse  Min: 66  Max: 99   Resp  Min: 16  Max: 18        FHT's: reactive, reassuring.  125 + accels, no decels, moderate variability   Cervix: was checked (by me): 7-8 cm / 90 % / -1   Contractions: regular every 4 minutes          Assessment   IUP at 39w2d, for elective IOL  GBS negative  Fetus reassuring     Plan   Continue with induction.  Patient making slow, steady progress    Alona Powell MD  2025  22:59 CST           NEGATIVE

## 2025-02-13 NOTE — PLAN OF CARE
Goal Outcome Evaluation:                    Last cervical exam 4cm, patient laboring with no interventions requested at this time. Maternal vitals WNL, EFM reassuring. [id number]

## 2025-02-13 NOTE — ANESTHESIA PREPROCEDURE EVALUATION
Anesthesia Evaluation     Patient summary reviewed and Nursing notes reviewed   history of anesthetic complications:  PONV  NPO Solid Status: > 8 hours  NPO Liquid Status: > 4 hours           Airway   Mallampati: II  TM distance: >3 FB  Neck ROM: full  No difficulty expected  Dental - normal exam     Pulmonary    Cardiovascular   Exercise tolerance: excellent (>7 METS)        Neuro/Psych  GI/Hepatic/Renal/Endo      Musculoskeletal     Abdominal    Substance History      OB/GYN    (+) Pregnant        Other                          Anesthesia Plan    ASA 2     epidural     intravenous induction     Anesthetic plan, risks, benefits, and alternatives have been provided, discussed and informed consent has been obtained with: patient.    Plan discussed with CRNA.        CODE STATUS:    Code Status (Patient has no pulse and is not breathing): CPR (Attempt to Resuscitate)  Medical Interventions (Patient has pulse or is breathing): Full Support

## 2025-02-13 NOTE — ANESTHESIA PROCEDURE NOTES
Labor Epidural      Patient reassessed immediately prior to procedure    Patient location during procedure: OB  Performed By  CRNA/CAA: Darryl Burgess CRNA  Preanesthetic Checklist  Completed: patient identified, IV checked, site marked, risks and benefits discussed, surgical consent, monitors and equipment checked, pre-op evaluation and timeout performed  Additional Notes  Patient had positive test dose on 1st attempt and 2nd attempt with no blood return, moved to different level and placed successfully with negative test dose.   Prep:  Sterile Tech:gloves, cap and sterile barrier  Monitoring:continuous pulse oximetry, EKG and blood pressure monitoring  Epidural Block Procedure:  Approach:midline  Guidance:landmark technique and palpation technique  Location:L2-L3  Needle Type:Tuohy  Needle Gauge:18 G  Loss of Resistance Medium: saline  Loss of Resistance: 8cm  Cath Depth at skin:14 cm  Aspiration:negative  Test Dose:negative (Patient had positive test dose on 1st attempt)  Medication: lidocaine 1.5%-EPINEPHrine 1:200,000 (XYLOCAINE W/EPI) injection - Injection   3 mL - 2/12/2025 8:23:00 PM  Number of Attempts: 3  Post Assessment:  Dressing:secured with tape and occlusive dressing applied  Pt Tolerance:patient tolerated the procedure well with no apparent complications  Complications:no

## 2025-02-13 NOTE — PLAN OF CARE
Goal Outcome Evaluation:           Progress: improving  Outcome Evaluation: vss, voiding, had BM today, FF/ML/U2, scant lochia, showered, lochia scant, expressed colostrum and giving formula per request, bonding well with infant, EPDS=0

## 2025-02-13 NOTE — L&D DELIVERY NOTE
"[facility]  Vaginal Delivery Note   Review the Delivery Report for details.       Delivery     Delivery: Vaginal, Spontaneous    YOB: 2024   Time of Birth:  Gestational Age 1:53 AM  39w3d     Anesthesia: Epidural    Delivering clinician: Alona Powell   Forceps?   No   Vacuum? No    Shoulder dystocia present: No        Delivery narrative: Patient pushed for only 15 minutes to achieve  over first-degree midline laceration.  Patient delivered a viable female infant, occiput anterior.  There was no nuchal cord noted.  There was no delay between delivery of the head and anterior shoulder.  Cord clamping delayed by 50 seconds, at which time nursery requested that baby be allowed to move to warmer.  Cord gases collected.  Spontaneous placenta, grossly intact upon inspection.  First-degree MML repaired with 3-0 Vicryl in the typical fashion.  No other abrasions or lacerations noted.  Sponge, needle, and instrument count confirmed with RN    Infant    Findings: female infant     Infant observations: Weight: No birth weight on file.  Length:   in  Observations/Comments:        Apgars:   @ 1 minute /      @ 5 minutes   Infant Name: Dayanna     Placenta, Cord, and Fluid    Placenta delivered  Spontaneous at   2025  1:56 AM    Cord: 3 vessels present.   Nuchal Cord?  no   Cord blood obtained: Yes   Cord gases obtained:  Yes   Cord gas results: Venous:  No results found for: \"PHCVEN\", \"BECVEN\"    Arterial:  No results found for: \"PHCART\", \"BECART\"     Repair    Episiotomy: None    No    Lacerations: Yes  Laceration Information  Laceration Repaired?   Perineal: 1st     Periurethral:       Labial:       Sulcus:       Vaginal:       Cervical:         Suture used for repair: 3-0 Vicryl  Laceration Length for 3rd or 4th degree lacerations: n/a   Estimated Blood Loss:               Quantitative Blood Loss:    QBL from VAG DEL: 182 (25 0204)             Complications  none    Disposition  Mother to Mother " Baby/Postpartum  in stable condition currently.  Baby to remains with mom  in stable condition currently.      Alona Powell MD  02/13/25  02:06 CST

## 2025-02-13 NOTE — PLAN OF CARE
Goal Outcome Evaluation:  Plan of Care Reviewed With: patient        Progress: improving  Outcome Evaluation: Patient delivered baby girl at 0153, PP Pitocin infusing

## 2025-02-14 VITALS
RESPIRATION RATE: 18 BRPM | DIASTOLIC BLOOD PRESSURE: 62 MMHG | SYSTOLIC BLOOD PRESSURE: 122 MMHG | OXYGEN SATURATION: 99 % | HEIGHT: 70 IN | TEMPERATURE: 98.3 F | BODY MASS INDEX: 26.48 KG/M2 | HEART RATE: 69 BPM | WEIGHT: 185 LBS

## 2025-02-14 LAB
BASOPHILS # BLD AUTO: 0.04 10*3/MM3 (ref 0–0.2)
BASOPHILS NFR BLD AUTO: 0.4 % (ref 0–1.5)
DEPRECATED RDW RBC AUTO: 47.9 FL (ref 37–54)
EOSINOPHIL # BLD AUTO: 0.3 10*3/MM3 (ref 0–0.4)
EOSINOPHIL NFR BLD AUTO: 2.7 % (ref 0.3–6.2)
ERYTHROCYTE [DISTWIDTH] IN BLOOD BY AUTOMATED COUNT: 13.8 % (ref 12.3–15.4)
HCT VFR BLD AUTO: 29 % (ref 34–46.6)
HGB BLD-MCNC: 9.3 G/DL (ref 12–15.9)
IMM GRANULOCYTES # BLD AUTO: 0.06 10*3/MM3 (ref 0–0.05)
IMM GRANULOCYTES NFR BLD AUTO: 0.5 % (ref 0–0.5)
LYMPHOCYTES # BLD AUTO: 1.97 10*3/MM3 (ref 0.7–3.1)
LYMPHOCYTES NFR BLD AUTO: 17.8 % (ref 19.6–45.3)
MCH RBC QN AUTO: 30.7 PG (ref 26.6–33)
MCHC RBC AUTO-ENTMCNC: 32.1 G/DL (ref 31.5–35.7)
MCV RBC AUTO: 95.7 FL (ref 79–97)
MONOCYTES # BLD AUTO: 0.68 10*3/MM3 (ref 0.1–0.9)
MONOCYTES NFR BLD AUTO: 6.1 % (ref 5–12)
NEUTROPHILS NFR BLD AUTO: 72.5 % (ref 42.7–76)
NEUTROPHILS NFR BLD AUTO: 8.04 10*3/MM3 (ref 1.7–7)
NRBC BLD AUTO-RTO: 0 /100 WBC (ref 0–0.2)
PLATELET # BLD AUTO: 165 10*3/MM3 (ref 140–450)
PMV BLD AUTO: 9.6 FL (ref 6–12)
RBC # BLD AUTO: 3.03 10*6/MM3 (ref 3.77–5.28)
WBC NRBC COR # BLD AUTO: 11.09 10*3/MM3 (ref 3.4–10.8)

## 2025-02-14 PROCEDURE — 85025 COMPLETE CBC W/AUTO DIFF WBC: CPT | Performed by: OBSTETRICS & GYNECOLOGY

## 2025-02-14 RX ADMIN — IBUPROFEN 600 MG: 600 TABLET, FILM COATED ORAL at 08:34

## 2025-02-14 RX ADMIN — ACETAMINOPHEN 650 MG: 325 TABLET ORAL at 08:34

## 2025-02-14 RX ADMIN — DOCUSATE SODIUM 100 MG: 100 CAPSULE, LIQUID FILLED ORAL at 08:34

## 2025-02-14 NOTE — PLAN OF CARE
Goal Outcome Evaluation:              Outcome Evaluation: VSS. Fundus WDL. scant lochia. formula feeding. pain well controlled w tylenol and motrin x1. bonding well with infant.

## 2025-02-14 NOTE — DISCHARGE SUMMARY
[facility]    Elza NILAM Gomezn, APRN  [address]  [place] [address]  [phone]      Discharge Summary     Liz Ramirez Day  : 1995  MRN: [id number]  CSN: [id number]    Date of Admission: 2025   Date of Discharge:  2025   Delivering Physician: Alona Powell       Admission Diagnosis: Prenatal care, subsequent pregnancy, third trimester [Z34.83]  Prenatal care, subsequent pregnancy, third trimester [Z34.83]   Discharge Diagnosis: Pregnancy at 39w3d - delivered       Procedures: 2025 - Vaginal, Spontaneous      Hospital Course  Patient is a 29 y.o.  who at 39w3d had a vaginal birth.  Her postpartum course was without complications.  On PPD 1, she was ready for discharge.  She had normal lochia and pain managed oral medication and comfort products.     Infant  female fetus weighing 3450 g (7 lb 9.7 oz)  Apgars -  6 @ 1 minute /  7 @ 5 minutes.    Discharge labs  Lab Results   Component Value Date    WBC 11.09 (H) 2025    HGB 9.3 (L) 2025    HCT 29.0 (L) 2025     2025       Discharge Medications     Discharge Medications        Continue These Medications        Instructions Start Date   ferrous sulfate 324 (65 Fe) MG tablet delayed-release EC tablet   324 mg, Daily With Breakfast      prenatal (CLASSIC) vitamin 28-0.8 MG tablet tablet  Generic drug: prenatal vitamin   1 tablet, Daily             Stop These Medications      ondansetron ODT 4 MG disintegrating tablet  Commonly known as: ZOFRAN-ODT              External Prenatal Results       Pregnancy Outside Results - Transcribed From Office Records - See Scanned Records For Details       Test Value Date Time    ABO  O  25 0635    Rh  Positive  25 0635    Antibody Screen  Negative  25 0635       Negative  24 1521    Varicella IgG  691 index 24 1521    Rubella  3.14 index 24 1521    Hgb  9.3 g/dL 25 0432       11.5 g/dL  02/12/25 0635       10.5 g/dL 11/27/24 1354       12.1 g/dL 08/13/24 1354       12.7 g/dL 07/16/24 1521    Hct  29.0 % 02/14/25 0432       34.2 % 02/12/25 0635       31.0 % 11/27/24 1354       35.6 % 08/13/24 1354       38.1 % 07/16/24 1521    HgB A1c   4.9 % 07/16/24 1521    1h GTT  113 mg/dL 11/27/24 1354    3h GTT Fasting       3h GTT 1 hour       3h GTT 2 hour       3h GTT 3 hour        Gonorrhea (discrete)  Negative  01/21/25 1458       Negative  07/16/24 1521    Chlamydia (discrete)  Negative  01/21/25 1458       Negative  07/16/24 1521    RPR  Non Reactive  11/27/24 1354       Non Reactive  07/16/24 1521    Syphils cascade: TP-Ab (FTA)  Non-Reactive  02/12/25 0635    TP-Ab  Non-Reactive  02/12/25 0635    TP-Ab (EIA)       TPPA       HBsAg  Negative  07/16/24 1521    Herpes Simplex Virus PCR       Herpes Simplex VIrus Culture       HIV  Non Reactive  07/16/24 1521    Hep C RNA Quant PCR       Hep C Antibody       AFP       NIPT       Cystic Fibrosis (Candelaria)       Cystic Fibroisis        Spinal Muscular atrophy       Fragile X       Group B Strep  Negative  01/21/25 1458    GBS Susceptibility to Clindamycin       GBS Susceptibility to Erythromycin       Fetal Fibronectin       Genetic Testing, Maternal Blood                 Drug Screening       Test Value Date Time    Urine Drug Screen       Amphetamine Screen       Barbiturate Screen       Benzodiazepine Screen       Methadone Screen       Phencyclidine Screen       Opiates Screen       THC Screen       Cocaine Screen       Propoxyphene Screen       Buprenorphine Screen       Methamphetamine Screen       Oxycodone Screen       Tricyclic Antidepressants Screen                 Legend    ^: Historical                            Discharge Disposition Home or Self Care   Condition on Discharge: good   Follow-up: 2 weeks for postpartum mood check with LANCE Polanco       Plan for discharge reviewed with Dr. Alarcon.    This note has been signed electronically.    Elza  MARELY Polanco, DNP, APRN, CNM  2/14/2025

## 2025-02-14 NOTE — PROGRESS NOTES
"      GRACE Dhillon  [facility]  [address]  [place] [address]  Office [phone]  Fax [phone]    [facility]  Vaginal Delivery Progress Note    Subjective   Postpartum Day 1: Vaginal Delivery    Feeling well and prepared for discharge. Ambulating and voiding without difficulty. Discomfort managed with motrin, tylenol, and comfort products. Describes bleeding as scant lochia. Has ceased breastfeeding and denies breast concerns.       Objective     Vital Signs Range for the last 24 hours  Temperature: Temp:  [97.1 °F (36.2 °C)-98.2 °F (36.8 °C)] 98.1 °F (36.7 °C)   Temp Source: Temp src: Temporal   BP: BP: (105-124)/(59-75) 106/59   Pulse: Heart Rate:  [60-74] 60   Respirations: Resp:  [16-18] 18   SPO2: SpO2:  [98 %-99 %] 99 %   O2 Amount (l/min):     O2 Devices Device (Oxygen Therapy): room air   Weight:       Admit Height:  Height: 177.8 cm (70\")      Physical Exam:  General:  no acute distresss. Cooperative. Normal mood and behavior  Abdomen: soft, without significant tenderness; fundus firm  Extremities: calves nontender, movement without difficulty, no edema    Lab results reviewed:  Yes   Rubella:  No results found for: \"RUBELLAIGGIN\" Nurse Transcribed from prenatal record --  No components found for: \"EXTRUBELQUAL\"  Rh Status:    RH type   Date Value Ref Range Status   02/12/2025 Positive  Final     Immunizations:   Immunization History   Administered Date(s) Administered    Flu Vaccine Split Quad 11/28/2016, 10/13/2017, 10/11/2018    Hepatitis B Adult/Adolescent IM 11/08/2016, 12/08/2016    PPD Test 10/31/2016, 11/08/2016, 10/16/2017, 10/24/2017, 09/11/2018    Tdap 01/06/2025     Lab Results (last 24 hours)       Procedure Component Value Units Date/Time    CBC & Differential [[id number]]  (Abnormal) Collected: 02/14/25 4024    Specimen: Blood Updated: 02/14/25 6298    Narrative:      The following orders were created for panel order CBC & Differential.  Procedure                 "               Abnormality         Status                     ---------                               -----------         ------                     CBC Auto Differential[[id number]]        Abnormal            Final result                 Please view results for these tests on the individual orders.    CBC Auto Differential [[id number]]  (Abnormal) Collected: 02/14/25 0432    Specimen: Blood Updated: 02/14/25 0505     WBC 11.09 10*3/mm3      RBC 3.03 10*6/mm3      Hemoglobin 9.3 g/dL      Hematocrit 29.0 %      MCV 95.7 fL      MCH 30.7 pg      MCHC 32.1 g/dL      RDW 13.8 %      RDW-SD 47.9 fl      MPV 9.6 fL      Platelets 165 10*3/mm3      Neutrophil % 72.5 %      Lymphocyte % 17.8 %      Monocyte % 6.1 %      Eosinophil % 2.7 %      Basophil % 0.4 %      Immature Grans % 0.5 %      Neutrophils, Absolute 8.04 10*3/mm3      Lymphocytes, Absolute 1.97 10*3/mm3      Monocytes, Absolute 0.68 10*3/mm3      Eosinophils, Absolute 0.30 10*3/mm3      Basophils, Absolute 0.04 10*3/mm3      Immature Grans, Absolute 0.06 10*3/mm3      nRBC 0.0 /100 WBC             External Prenatal Results       Pregnancy Outside Results - Transcribed From Office Records - See Scanned Records For Details       Test Value Date Time    ABO  O  02/12/25 0635    Rh  Positive  02/12/25 0635    Antibody Screen  Negative  02/12/25 0635       Negative  07/16/24 1521    Varicella IgG  691 index 07/16/24 1521    Rubella  3.14 index 07/16/24 1521    Hgb  9.3 g/dL 02/14/25 0432       11.5 g/dL 02/12/25 0635       10.5 g/dL 11/27/24 1354       12.1 g/dL 08/13/24 1354       12.7 g/dL 07/16/24 1521    Hct  29.0 % 02/14/25 0432       34.2 % 02/12/25 0635       31.0 % 11/27/24 1354       35.6 % 08/13/24 1354       38.1 % 07/16/24 1521    HgB A1c   4.9 % 07/16/24 1521    1h GTT  113 mg/dL 11/27/24 1354    3h GTT Fasting       3h GTT 1 hour       3h GTT 2 hour       3h GTT 3 hour        Gonorrhea (discrete)  Negative  01/21/25 1458       Negative  07/16/24 1525     Chlamydia (discrete)  Negative  01/21/25 1458       Negative  07/16/24 1521    RPR  Non Reactive  11/27/24 1354       Non Reactive  07/16/24 1521    Syphils cascade: TP-Ab (FTA)  Non-Reactive  02/12/25 0635    TP-Ab  Non-Reactive  02/12/25 0635    TP-Ab (EIA)       TPPA       HBsAg  Negative  07/16/24 1521    Herpes Simplex Virus PCR       Herpes Simplex VIrus Culture       HIV  Non Reactive  07/16/24 1521    Hep C RNA Quant PCR       Hep C Antibody       AFP       NIPT       Cystic Fibrosis (Candelaria)       Cystic Fibroisis        Spinal Muscular atrophy       Fragile X       Group B Strep  Negative  01/21/25 1458    GBS Susceptibility to Clindamycin       GBS Susceptibility to Erythromycin       Fetal Fibronectin       Genetic Testing, Maternal Blood                 Drug Screening       Test Value Date Time    Urine Drug Screen       Amphetamine Screen       Barbiturate Screen       Benzodiazepine Screen       Methadone Screen       Phencyclidine Screen       Opiates Screen       THC Screen       Cocaine Screen       Propoxyphene Screen       Buprenorphine Screen       Methamphetamine Screen       Oxycodone Screen       Tricyclic Antidepressants Screen                 Legend    ^: Historical                            Assessment & Plan       * No active hospital problems. *      Yumi Ramirez Day is Day 1  post-partum  Vaginal, Spontaneous  .      Plan:  Continue current postpartum plan of care. Lactation suppression reviewed. Discharge home with standard precautions. Return to the clinic in 2 weeks for a postpartum mood check and as needed with concerns.       Plan for discharge reviewed with Dr. Alarcon.     This note has been signed electronically.    Elza Polanco, DNP, APRN, CNM  2/14/2025  07:20 CST

## 2025-02-14 NOTE — ANESTHESIA POSTPROCEDURE EVALUATION
"Patient: Yumi Ramirez Day    Procedure Summary       Date: 02/12/25 Room / Location:     Anesthesia Start: 1956 Anesthesia Stop: 02/13/25 0153    Procedure: LABOR ANALGESIA Diagnosis:     Scheduled Providers:  Provider:     Anesthesia Type: epidural ASA Status: 2            Anesthesia Type: epidural    Vitals  Vitals Value Taken Time   /62 02/14/25 0722   Temp 98.3 °F (36.8 °C) 02/14/25 0722   Pulse 69 02/14/25 0722   Resp 18 02/14/25 0722   SpO2 99 % 02/14/25 0722           Post Anesthesia Care and Evaluation    Patient location during evaluation: PHASE II  Patient participation: complete - patient participated  Level of consciousness: awake and alert  Pain management: adequate    Airway patency: patent  Anesthetic complications: No anesthetic complications  PONV Status: none  Cardiovascular status: acceptable  Respiratory status: acceptable  Hydration status: acceptable    Comments: Blood pressure 122/62, pulse 69, temperature 98.3 °F (36.8 °C), temperature source Temporal, resp. rate 18, height 177.8 cm (70\"), weight 83.9 kg (185 lb), last menstrual period 05/15/2024, SpO2 99%, not currently breastfeeding.    No anesthesia care post op    "

## 2025-02-14 NOTE — PLAN OF CARE
Goal Outcome Evaluation:  Plan of Care Reviewed With: patient        Progress: improving  Outcome Evaluation: Patient cleared for discharge by provider. Discharge  education provided and patient verbalized understanding.  Laura Cardoso RN  13:30 CST

## 2025-02-16 ENCOUNTER — MATERNAL SCREENING (OUTPATIENT)
Dept: CALL CENTER | Facility: HOSPITAL | Age: 30
End: 2025-02-16
Payer: COMMERCIAL

## 2025-02-16 NOTE — OUTREACH NOTE
Maternal Screening Survey      Flowsheet Row Responses   Eligibility Eligible   Prep survey completed? Yes   Facility patient discharged from? [facility] - Registered Nurse

## 2025-02-23 ENCOUNTER — MATERNAL SCREENING (OUTPATIENT)
Dept: CALL CENTER | Facility: HOSPITAL | Age: 30
End: 2025-02-23
Payer: COMMERCIAL

## 2025-02-23 NOTE — OUTREACH NOTE
Maternal Screening Survey      Flowsheet Row Responses   Facility patient discharged from? [facility]   Attempt successful? Yes   Call start time 1503   Call end time 1505   I have been able to laugh and see the funny side of things. 0   I have looked forward with enjoyment to things. 0   I have blamed myself unnecessarily when things went wrong. 0   I have been anxious or worried for no good reason. 0   I have felt scared or panicky for no good reason. 0   Things have been getting on top of me. 0   I have been so unhappy that I have had difficulty sleeping. 0   I have felt sad or miserable. 0   I have been so unhappy that I have been crying. 0   The thought of harming myself has occurred to me. 0   [place]  Depression Scale Total 0   Did any of your parents have problems with alcohol or drug use? No   Do any of your peers have problems with alcohol or drug use? No   Does your partner have problems with alcohol or drug use? No   Before you were pregnant did you have problems with alcohol or drug use? (past) No   In the past month, did you drink beer, wine, liquor or use any other drugs? (pregnancy) No   Maternal Screening call completed Yes   Call end time 1505              [facility] - Registered Nurse

## 2025-02-27 ENCOUNTER — POSTPARTUM VISIT (OUTPATIENT)
Age: 30
End: 2025-02-27
Payer: COMMERCIAL

## 2025-02-27 VITALS
HEIGHT: 70 IN | BODY MASS INDEX: 23.19 KG/M2 | SYSTOLIC BLOOD PRESSURE: 122 MMHG | DIASTOLIC BLOOD PRESSURE: 64 MMHG | WEIGHT: 162 LBS

## 2025-02-27 DIAGNOSIS — Z13.32 ENCOUNTER FOR SCREENING FOR MATERNAL DEPRESSION: Primary | ICD-10-CM

## 2025-02-27 RX ORDER — ACETAMINOPHEN 500 MG
500 TABLET ORAL EVERY 6 HOURS PRN
COMMUNITY

## 2025-02-27 NOTE — PROGRESS NOTES
"Subjective   Chief Complaint   Patient presents with    Postpartum Care     Vaginal delivery 2025, baby girl.      Yumi Orozco is a 29 y.o. year old  presenting to be seen for a postpartum visit for a postpartum mood assessment.  She had a vaginal delivery. Prenatal course was benign.     Lower back aching pain that radiates around to the anterior side; seeing BENJIE Son in [facility]'s office. Has stopped breastfeeding. Bleeding has decreased. Denies postpartum mood concerns.     The following portions of the patient's history were reviewed and updated as appropriate: problem list, current medications, and allergies    Social History     Socioeconomic History    Marital status:    Tobacco Use    Smoking status: Never    Smokeless tobacco: Never   Vaping Use    Vaping status: Never Used   Substance and Sexual Activity    Alcohol use: No    Drug use: No    Sexual activity: Yes     Partners: Male     Birth control/protection: None     Review of Systems   Constitutional:  Negative for fatigue.   Respiratory:  Negative for shortness of breath.    Cardiovascular:  Negative for chest pain and leg swelling.   Gastrointestinal:  Negative for abdominal pain, constipation and diarrhea.   Endocrine: Negative for cold intolerance and heat intolerance.   Genitourinary:  Positive for vaginal bleeding. Negative for difficulty urinating, dysuria, pelvic pain and vaginal discharge.   Musculoskeletal:  Negative for back pain.   Skin:  Negative for rash and wound.   Neurological:  Negative for dizziness and headaches.   Hematological:  Negative for adenopathy.   Psychiatric/Behavioral:  Negative for dysphoric mood, self-injury and suicidal ideas. The patient is not nervous/anxious.          Objective   /64   Ht 177.8 cm (70\")   Wt 73.5 kg (162 lb)   LMP 05/15/2024   Breastfeeding No Comment: gentle ease  BMI 23.24 kg/m²     General:  well developed; well nourished  no acute distress  Calm, cooperative. " Normal mood, behavior, and affect.    Abdomen: soft, non-tender; no masses   Extremities: Steady gait, calves nontender, no edema   Pelvis: Not performed.       Cervical cancer screening: 10/20/2020 [facility]       Diagnoses and all orders for this visit:    1. Encounter for screening for maternal depression (Primary)    2. Postpartum examination following vaginal delivery    3. Lactating mother      EPDS today with score of 4. Postpartum self-care for mental and physical well-being and signs to report discussed. Has a healthy support system. Continue with prenatal vitamin or start a  vitamin. Continue with perineal comfort measures as needed. Maintain pelvic rest while resuming other activities as tolerated. Spouse plans for vasectomy.         Refer to AVS instructions for additional education provided.         Return to the clinic in 4 weeks for a routine postpartum visit and as needed with concerns.         This note was electronically signed.    Elza Polanco, JOANIE, APRN, CNM  2025

## 2025-03-26 ENCOUNTER — POSTPARTUM VISIT (OUTPATIENT)
Age: 30
End: 2025-03-26
Payer: COMMERCIAL

## 2025-03-26 VITALS
DIASTOLIC BLOOD PRESSURE: 82 MMHG | SYSTOLIC BLOOD PRESSURE: 118 MMHG | BODY MASS INDEX: 23.68 KG/M2 | WEIGHT: 165.4 LBS | HEIGHT: 70 IN

## 2025-03-26 DIAGNOSIS — O26.819 PREGNANCY RELATED FATIGUE, ANTEPARTUM: Primary | ICD-10-CM

## 2025-03-26 DIAGNOSIS — Z12.4 ENCOUNTER FOR SCREENING FOR CERVICAL CANCER: ICD-10-CM

## 2025-03-26 PROCEDURE — G0123 SCREEN CERV/VAG THIN LAYER: HCPCS | Performed by: OBSTETRICS & GYNECOLOGY

## 2025-03-26 RX ORDER — VITAMIN A, ASCORBIC ACID, CHOLECALCIFEROL, ALPHA-TOCOPHEROL ACETATE, THIAMINE HYDROCHLORIDE, RIBOFLAVIN 5-PHOSPHATE SODIUM, NIACINAMIDE, PYRIDOXINE HYDROCHLORIDE, FERROUS SULFATE AND SODIUM FLUORIDE 1500; 35; 400; 5; .5; .6; 8; .4; 10; .25 [IU]/ML; MG/ML; [IU]/ML; [IU]/ML; MG/ML; MG/ML; MG/ML; MG/ML; MG/ML; MG/ML
LIQUID ORAL
COMMUNITY

## 2025-03-28 LAB
GEN CATEG CVX/VAG CYTO-IMP: ABNORMAL
LAB AP CASE REPORT: ABNORMAL
LAB AP GYN ADDITIONAL INFORMATION: ABNORMAL
LAB AP GYN OTHER FINDINGS: ABNORMAL
Lab: ABNORMAL
PATH INTERP SPEC-IMP: ABNORMAL
STAT OF ADQ CVX/VAG CYTO-IMP: ABNORMAL

## 2025-04-15 ENCOUNTER — OFFICE VISIT (OUTPATIENT)
Age: 30
End: 2025-04-15
Payer: COMMERCIAL

## 2025-04-15 VITALS
WEIGHT: 168 LBS | BODY MASS INDEX: 24.05 KG/M2 | HEIGHT: 70 IN | DIASTOLIC BLOOD PRESSURE: 70 MMHG | SYSTOLIC BLOOD PRESSURE: 110 MMHG

## 2025-04-15 DIAGNOSIS — R87.612 LGSIL ON PAP SMEAR OF CERVIX: Primary | ICD-10-CM

## 2025-04-15 LAB
B-HCG UR QL: NEGATIVE
EXPIRATION DATE: NORMAL
INTERNAL NEGATIVE CONTROL: NORMAL
INTERNAL POSITIVE CONTROL: NORMAL
Lab: NORMAL

## 2025-04-15 PROCEDURE — 88305 TISSUE EXAM BY PATHOLOGIST: CPT | Performed by: OBSTETRICS & GYNECOLOGY

## 2025-04-15 RX ORDER — LANOLIN ALCOHOL/MO/W.PET/CERES
1000 CREAM (GRAM) TOPICAL DAILY
COMMUNITY

## 2025-04-15 NOTE — PROGRESS NOTES
Preop Dx: 1.LGSIL  Post op Dx: same  Procedure: colposcopy with biopsy of the cervix  Surgeon: Helen Cuevas MD  Complications: none  EBL: minimal     Summary: Consent was obtained. Procedure/risks were explained including infection, bleeding, abdominal pain/cramping. Correct patient identified. Correct site confirmed and time out complete. Urine pregnancy test negative. Pt was prepped in the dorsal lithotomy positive.  A bivalve speculum was placed in the vagina. The cervix was prepped using acetic acid. Under colposcopic examination, the transition zone was seen in its entirety. Punch biopsies were performed 8 o'clock. Specimen was sent to pathology.  Silver nitrate was applied for hemostasis. Patient tolerated the procedure well.     Findings/impression:    Will call  in 5-7 days with biopsy results and further recommendations.

## 2025-04-17 NOTE — PROGRESS NOTES
"Chief Complaint  Postpartum Care (Pt is here for 6wk postpartum f/u. Pt had a vaginal delivery at 39w 3d with Dr. Powell. Baby girl Angella, last pap , having issues at night with fatigue would like to know if there is a vitamin she should be taking to help, PPD 3)    Subjective        Yumi Orozco presents to [facility] for postpartum visit.  without complications. Pt doing well except extreme fatigue in the evenings. Baby doing well. Needs pap smear. Denies pain, vaginal discharge. Reports normal bowel and bladder function. Bottle feeding. Would like OCPs.   History of Present Illness    Objective   Vital Signs:  /82 (BP Location: Left arm, Patient Position: Sitting, Cuff Size: Adult)   Ht 177.8 cm (70\")   Wt 75 kg (165 lb 6.4 oz)   BMI 23.73 kg/m²   Estimated body mass index is 23.73 kg/m² as calculated from the following:    Height as of this encounter: 177.8 cm (70\").    Weight as of this encounter: 75 kg (165 lb 6.4 oz).    BMI is within normal parameters. No other follow-up for BMI required.      Physical Exam   Normal external genitalia, vaginal mucosa well healed without lesions, cervix 2x2 cm without lesions, pap smear done, bimanual exam NT/no masses  Result Review :                Assessment and Plan   Diagnoses and all orders for this visit:    1. Pregnancy related fatigue, antepartum (Primary)  -     Vitamin B12  -     Vitamin D,25-Hydroxy  -     CBC (No Diff)  -     Iron Profile  -     TSH Rfx On Abnormal To Free T4  Labs today   2. Encounter for screening for cervical cancer  -     Liquid-based Pap Smear, Screening  Pap smear today   3. Postpartum examination following vaginal delivery  30 y/o CF here for pp visit after .  Loestrin to start with menses. RTC yearly. Pap smear today. Resume all normal activities.   Other orders  -     norethindrone-ethinyl estradiol-ferrous fumarate (LOESTIN 24 FE) 1-20 MG-MCG(24) per tablet; Take 1 tablet by mouth Daily.  " Dispense: 28 tablet; Refill: 12             Follow Up   No follow-ups on file.  Patient was given instructions and counseling regarding her condition or for health maintenance advice. Please see specific information pulled into the AVS if appropriate.

## 2025-04-20 LAB
CYTO UR: NORMAL
LAB AP CASE REPORT: NORMAL
LAB AP CLINICAL INFORMATION: NORMAL
Lab: NORMAL
PATH REPORT.FINAL DX SPEC: NORMAL
PATH REPORT.GROSS SPEC: NORMAL

## 2025-05-02 ENCOUNTER — TELEPHONE (OUTPATIENT)
Dept: OBSTETRICS AND GYNECOLOGY | Age: 30
End: 2025-05-02
Payer: COMMERCIAL

## 2025-05-02 NOTE — TELEPHONE ENCOUNTER
Pt called and left VM regarding concern with OCP and irregular bleeding. Returned phone call to pt and she reports she had first cycle 8 weeks postpartum beginning 4/8 and lasting approximately 6 days followed by 1 week of no bleeding and then bleeding began again after 1 week and ending 5/1. Pt reports second period was heavier than normal as well. Pt had last OV 4/15 for yearly and f/u PP. Pt began OCP 3/26. Pt advised irregular bleeding is common following initiation of OCP and to continue to monitor irregularity. Pt advised it may take several months for this to regulate. Pt advised with further concerns of irregular bleeding or cramping to return call to office. Pt voices understanding to conversation.

## 2025-05-12 ENCOUNTER — TELEPHONE (OUTPATIENT)
Dept: OBSTETRICS AND GYNECOLOGY | Age: 30
End: 2025-05-12
Payer: COMMERCIAL

## 2025-05-15 NOTE — TELEPHONE ENCOUNTER
Provider: Helen Cuevas MD     Caller: Yumi Orozco  Female, 29 y.o., 1995  MRN: [id number]  CSN: [id number]  Phone: [phone]    Relationship to Patient: SELF        Reason for Call: PT REQ   AN RX FOR ULTRA HUMAN SMART RING BE SENT OVER TO INS AND COVERED FOR FAMILY PLANNING, PT STATES CPT CODE MUST BE SENT AS WELL, PT REQ A RESPONSE THROUGH [employer] WHEN SENT     
Pt calling to ask about possibly getting prescription for Human smart ring sent over to insurance to be covered for family planing along with CPT code. Pt advised to speak with insurance regarding coverage and return call with further recommendations. Pt voices understanding.   
unknown

## (undated) DEVICE — SYR LL TP 10ML STRL

## (undated) DEVICE — SPNG GZ 2S 2X2 8PLY STRL PK/2

## (undated) DEVICE — ADHS LIQ MASTISOL 2/3ML

## (undated) DEVICE — SYR CONTRL LUERLOK 10CC

## (undated) DEVICE — ENDOPATH XCEL WITH OPTIVIEW TECHNOLOGY BLADELESS TROCARS WITH STABILITY SLEEVES: Brand: ENDOPATH XCEL OPTIVIEW

## (undated) DEVICE — PK TURNOVER RM ADV

## (undated) DEVICE — DRSNG SURESITE WNDW 2.38X2.75

## (undated) DEVICE — SUT VIC RAPD SZ 3/0 27IN PS1 VR935

## (undated) DEVICE — NDL HYPO PRECISIONGLIDE REG 22G 1 1/2

## (undated) DEVICE — HARMONIC ACE +7 LAPAROSCOPIC SHEARS ADVANCED HEMOSTASIS 5MM DIAMETER 36CM SHAFT LENGTH  FOR USE WITH GRAY HAND PIECE ONLY: Brand: HARMONIC ACE

## (undated) DEVICE — ENDOPATH XCEL WITH OPTIVIEW TECHNOLOGY UNIVERSAL TROCAR STABILITY SLEEVES: Brand: ENDOPATH XCEL OPTIVIEW

## (undated) DEVICE — GLV SURG TRIUMPH MICRO PF LTX 8 STRL

## (undated) DEVICE — 3M™ STERI-STRIP™ REINFORCED ADHESIVE SKIN CLOSURES, R1547, 1/2 IN X 4 IN (12 MM X 100 MM), 6 STRIPS/ENVELOPE: Brand: 3M™ STERI-STRIP™

## (undated) DEVICE — GLV SURG BIOGEL M LTX PF 8

## (undated) DEVICE — PK LAP GYN 30